# Patient Record
Sex: MALE | Race: BLACK OR AFRICAN AMERICAN | NOT HISPANIC OR LATINO | RURAL
[De-identification: names, ages, dates, MRNs, and addresses within clinical notes are randomized per-mention and may not be internally consistent; named-entity substitution may affect disease eponyms.]

---

## 2021-12-10 ENCOUNTER — HOSPITAL ENCOUNTER (EMERGENCY)
Facility: HOSPITAL | Age: 26
Discharge: LEFT BEFORE DISCHARGE | End: 2021-12-10
Attending: FAMILY MEDICINE
Payer: OTHER GOVERNMENT

## 2021-12-10 VITALS
WEIGHT: 210 LBS | HEART RATE: 104 BPM | DIASTOLIC BLOOD PRESSURE: 89 MMHG | SYSTOLIC BLOOD PRESSURE: 122 MMHG | RESPIRATION RATE: 19 BRPM | OXYGEN SATURATION: 97 % | HEIGHT: 74 IN | BODY MASS INDEX: 26.95 KG/M2

## 2021-12-10 DIAGNOSIS — F20.0 PARANOID SCHIZOPHRENIA: Primary | ICD-10-CM

## 2021-12-10 LAB
ALBUMIN SERPL BCP-MCNC: 3.9 G/DL (ref 3.5–5)
ALBUMIN/GLOB SERPL: 1.1 {RATIO}
ALP SERPL-CCNC: 96 U/L (ref 45–115)
ALT SERPL W P-5'-P-CCNC: 25 U/L (ref 16–61)
AMPHET UR QL SCN: POSITIVE
ANION GAP SERPL CALCULATED.3IONS-SCNC: 13 MMOL/L (ref 7–16)
APAP SERPL-MCNC: <2 ΜG/ML (ref 10–30)
AST SERPL W P-5'-P-CCNC: 19 U/L (ref 15–37)
BARBITURATES UR QL SCN: NEGATIVE
BASOPHILS # BLD AUTO: 0.08 K/UL (ref 0–0.2)
BASOPHILS NFR BLD AUTO: 0.9 % (ref 0–1)
BENZODIAZ METAB UR QL SCN: NEGATIVE
BILIRUB SERPL-MCNC: 0.4 MG/DL (ref 0–1.2)
BILIRUB UR QL STRIP: NEGATIVE
BUN SERPL-MCNC: 7 MG/DL (ref 7–18)
BUN/CREAT SERPL: 5 (ref 6–20)
CALCIUM SERPL-MCNC: 9 MG/DL (ref 8.5–10.1)
CANNABINOIDS UR QL SCN: POSITIVE
CHLORIDE SERPL-SCNC: 105 MMOL/L (ref 98–107)
CLARITY UR: CLEAR
CO2 SERPL-SCNC: 29 MMOL/L (ref 21–32)
COCAINE UR QL SCN: NEGATIVE
COLOR UR: YELLOW
CREAT SERPL-MCNC: 1.31 MG/DL (ref 0.7–1.3)
DIFFERENTIAL METHOD BLD: ABNORMAL
EOSINOPHIL # BLD AUTO: 0.46 K/UL (ref 0–0.5)
EOSINOPHIL NFR BLD AUTO: 5.3 % (ref 1–4)
ERYTHROCYTE [DISTWIDTH] IN BLOOD BY AUTOMATED COUNT: 12.8 % (ref 11.5–14.5)
ETHANOL, BLOOD (CATEGORY): NOT DETECTED
GLOBULIN SER-MCNC: 3.7 G/DL (ref 2–4)
GLUCOSE SERPL-MCNC: 90 MG/DL (ref 74–106)
GLUCOSE UR STRIP-MCNC: NEGATIVE MG/DL
HCT VFR BLD AUTO: 44.7 % (ref 40–54)
HGB BLD-MCNC: 15.7 G/DL (ref 13.5–18)
IMM GRANULOCYTES # BLD AUTO: 0.02 K/UL (ref 0–0.04)
IMM GRANULOCYTES NFR BLD: 0.2 % (ref 0–0.4)
KETONES UR STRIP-SCNC: ABNORMAL MG/DL
LEUKOCYTE ESTERASE UR QL STRIP: NEGATIVE
LYMPHOCYTES # BLD AUTO: 3.03 K/UL (ref 1–4.8)
LYMPHOCYTES NFR BLD AUTO: 35 % (ref 27–41)
MCH RBC QN AUTO: 30.5 PG (ref 27–31)
MCHC RBC AUTO-ENTMCNC: 35.1 G/DL (ref 32–36)
MCV RBC AUTO: 87 FL (ref 80–96)
MONOCYTES # BLD AUTO: 0.96 K/UL (ref 0–0.8)
MONOCYTES NFR BLD AUTO: 11.1 % (ref 2–6)
MPC BLD CALC-MCNC: 9.8 FL (ref 9.4–12.4)
NEUTROPHILS # BLD AUTO: 4.1 K/UL (ref 1.8–7.7)
NEUTROPHILS NFR BLD AUTO: 47.5 % (ref 53–65)
NITRITE UR QL STRIP: NEGATIVE
NRBC # BLD AUTO: 0 X10E3/UL
NRBC, AUTO (.00): 0 %
OPIATES UR QL SCN: NEGATIVE
PCP UR QL SCN: NEGATIVE
PH UR STRIP: 6 PH UNITS
PLATELET # BLD AUTO: 330 K/UL (ref 150–400)
POTASSIUM SERPL-SCNC: 3.9 MMOL/L (ref 3.5–5.1)
PROT SERPL-MCNC: 7.6 G/DL (ref 6.4–8.2)
PROT UR QL STRIP: NEGATIVE
RBC # BLD AUTO: 5.14 M/UL (ref 4.6–6.2)
RBC # UR STRIP: NEGATIVE /UL
SALICYLATES SERPL-MCNC: 4.3 MG/DL (ref 3–30)
SARS-COV-2 RDRP RESP QL NAA+PROBE: NEGATIVE
SODIUM SERPL-SCNC: 143 MMOL/L (ref 136–145)
SP GR UR STRIP: >=1.03
UROBILINOGEN UR STRIP-ACNC: 0.2 MG/DL
WBC # BLD AUTO: 8.65 K/UL (ref 4.5–11)

## 2021-12-10 PROCEDURE — 81003 URINALYSIS AUTO W/O SCOPE: CPT | Mod: 59 | Performed by: FAMILY MEDICINE

## 2021-12-10 PROCEDURE — 87635 SARS-COV-2 COVID-19 AMP PRB: CPT | Performed by: FAMILY MEDICINE

## 2021-12-10 PROCEDURE — 99283 EMERGENCY DEPT VISIT LOW MDM: CPT | Mod: ,,, | Performed by: FAMILY MEDICINE

## 2021-12-10 PROCEDURE — 63600175 PHARM REV CODE 636 W HCPCS: Performed by: FAMILY MEDICINE

## 2021-12-10 PROCEDURE — 96372 THER/PROPH/DIAG INJ SC/IM: CPT

## 2021-12-10 PROCEDURE — 36415 COLL VENOUS BLD VENIPUNCTURE: CPT | Performed by: FAMILY MEDICINE

## 2021-12-10 PROCEDURE — 85025 COMPLETE CBC W/AUTO DIFF WBC: CPT | Performed by: FAMILY MEDICINE

## 2021-12-10 PROCEDURE — 99283 PR EMERGENCY DEPT VISIT,LEVEL III: ICD-10-PCS | Mod: ,,, | Performed by: FAMILY MEDICINE

## 2021-12-10 PROCEDURE — 80053 COMPREHEN METABOLIC PANEL: CPT | Performed by: FAMILY MEDICINE

## 2021-12-10 PROCEDURE — 80143 DRUG ASSAY ACETAMINOPHEN: CPT | Performed by: FAMILY MEDICINE

## 2021-12-10 PROCEDURE — 80307 DRUG TEST PRSMV CHEM ANLYZR: CPT | Performed by: FAMILY MEDICINE

## 2021-12-10 PROCEDURE — 99284 EMERGENCY DEPT VISIT MOD MDM: CPT

## 2021-12-10 PROCEDURE — 82077 ASSAY SPEC XCP UR&BREATH IA: CPT | Performed by: FAMILY MEDICINE

## 2021-12-10 RX ORDER — KETOROLAC TROMETHAMINE 30 MG/ML
60 INJECTION, SOLUTION INTRAMUSCULAR; INTRAVENOUS
Status: COMPLETED | OUTPATIENT
Start: 2021-12-10 | End: 2021-12-10

## 2021-12-10 RX ORDER — HALOPERIDOL 5 MG/ML
5 INJECTION INTRAMUSCULAR
Status: COMPLETED | OUTPATIENT
Start: 2021-12-10 | End: 2021-12-10

## 2021-12-10 RX ADMIN — KETOROLAC TROMETHAMINE 60 MG: 30 INJECTION, SOLUTION INTRAMUSCULAR; INTRAVENOUS at 12:12

## 2021-12-10 RX ADMIN — HALOPERIDOL LACTATE 5 MG: 5 INJECTION, SOLUTION INTRAMUSCULAR at 12:12

## 2022-04-30 ENCOUNTER — HOSPITAL ENCOUNTER (INPATIENT)
Facility: HOSPITAL | Age: 27
LOS: 6 days | Discharge: HOME | DRG: 897 | End: 2022-05-06
Attending: EMERGENCY MEDICINE | Admitting: INTERNAL MEDICINE

## 2022-04-30 DIAGNOSIS — R00.0 TACHYCARDIA: ICD-10-CM

## 2022-04-30 DIAGNOSIS — N17.9 AKI (ACUTE KIDNEY INJURY): ICD-10-CM

## 2022-04-30 DIAGNOSIS — F29 PSYCHOSIS, UNSPECIFIED PSYCHOSIS TYPE: ICD-10-CM

## 2022-04-30 DIAGNOSIS — R45.851 SUICIDAL IDEATION: ICD-10-CM

## 2022-04-30 DIAGNOSIS — R00.0 SINUS TACHYCARDIA: ICD-10-CM

## 2022-04-30 DIAGNOSIS — T14.90XA INJURY: ICD-10-CM

## 2022-04-30 DIAGNOSIS — F15.921 AMPHETAMINE DELIRIUM: ICD-10-CM

## 2022-04-30 DIAGNOSIS — R50.9 FEVER: ICD-10-CM

## 2022-04-30 DIAGNOSIS — F19.10 POLYSUBSTANCE ABUSE: ICD-10-CM

## 2022-04-30 DIAGNOSIS — F15.929 METHAMPHETAMINE INTOXICATION: Primary | ICD-10-CM

## 2022-04-30 DIAGNOSIS — M62.82 NON-TRAUMATIC RHABDOMYOLYSIS: ICD-10-CM

## 2022-04-30 DIAGNOSIS — J18.9 PNEUMONIA OF LOWER LOBE DUE TO INFECTIOUS ORGANISM, UNSPECIFIED LATERALITY: ICD-10-CM

## 2022-04-30 LAB
ALBUMIN SERPL BCP-MCNC: 3.7 G/DL (ref 3.5–5)
ALBUMIN/GLOB SERPL: 1.1 {RATIO}
ALP SERPL-CCNC: 95 U/L (ref 45–115)
ALT SERPL W P-5'-P-CCNC: 27 U/L (ref 16–61)
AMPHET UR QL SCN: POSITIVE
ANION GAP SERPL CALCULATED.3IONS-SCNC: 29 MMOL/L (ref 7–16)
APAP SERPL-MCNC: <2 ΜG/ML (ref 10–30)
AST SERPL W P-5'-P-CCNC: 23 U/L (ref 15–37)
BACTERIA #/AREA URNS HPF: ABNORMAL /HPF
BARBITURATES UR QL SCN: NEGATIVE
BASOPHILS # BLD AUTO: 0.08 K/UL (ref 0–0.2)
BASOPHILS NFR BLD AUTO: 0.8 % (ref 0–1)
BENZODIAZ METAB UR QL SCN: POSITIVE
BILIRUB SERPL-MCNC: 0.7 MG/DL (ref 0–1.2)
BILIRUB UR QL STRIP: NEGATIVE
BUN SERPL-MCNC: 7 MG/DL (ref 7–18)
BUN/CREAT SERPL: 3 (ref 6–20)
CALCIUM SERPL-MCNC: 9.8 MG/DL (ref 8.5–10.1)
CANNABINOIDS UR QL SCN: POSITIVE
CHLORIDE SERPL-SCNC: 102 MMOL/L (ref 98–107)
CK SERPL-CCNC: 757 U/L (ref 39–308)
CLARITY UR: ABNORMAL
CO2 SERPL-SCNC: 14 MMOL/L (ref 21–32)
COCAINE UR QL SCN: NEGATIVE
COLOR UR: YELLOW
CREAT SERPL-MCNC: 2.08 MG/DL (ref 0.7–1.3)
DIFFERENTIAL METHOD BLD: ABNORMAL
EOSINOPHIL # BLD AUTO: 0.29 K/UL (ref 0–0.5)
EOSINOPHIL NFR BLD AUTO: 2.7 % (ref 1–4)
ERYTHROCYTE [DISTWIDTH] IN BLOOD BY AUTOMATED COUNT: 12.6 % (ref 11.5–14.5)
ETHANOL, BLOOD (CATEGORY): NOT DETECTED
FLUAV AG UPPER RESP QL IA.RAPID: NEGATIVE
FLUBV AG UPPER RESP QL IA.RAPID: NEGATIVE
GLOBULIN SER-MCNC: 3.5 G/DL (ref 2–4)
GLUCOSE SERPL-MCNC: 133 MG/DL (ref 74–106)
GLUCOSE UR STRIP-MCNC: NEGATIVE MG/DL
HCT VFR BLD AUTO: 45.8 % (ref 40–54)
HGB BLD-MCNC: 16 G/DL (ref 13.5–18)
IMM GRANULOCYTES # BLD AUTO: 0.3 K/UL (ref 0–0.04)
IMM GRANULOCYTES NFR BLD: 2.8 % (ref 0–0.4)
KETONES UR STRIP-SCNC: ABNORMAL MG/DL
LEUKOCYTE ESTERASE UR QL STRIP: NEGATIVE
LYMPHOCYTES # BLD AUTO: 1.59 K/UL (ref 1–4.8)
LYMPHOCYTES NFR BLD AUTO: 14.9 % (ref 27–41)
MAGNESIUM SERPL-MCNC: 3.2 MG/DL (ref 1.7–2.3)
MCH RBC QN AUTO: 30.2 PG (ref 27–31)
MCHC RBC AUTO-ENTMCNC: 34.9 G/DL (ref 32–36)
MCV RBC AUTO: 86.6 FL (ref 80–96)
MONOCYTES # BLD AUTO: 0.81 K/UL (ref 0–0.8)
MONOCYTES NFR BLD AUTO: 7.6 % (ref 2–6)
MPC BLD CALC-MCNC: 9.8 FL (ref 9.4–12.4)
MUCOUS THREADS #/AREA URNS HPF: ABNORMAL /HPF
NEUTROPHILS # BLD AUTO: 7.57 K/UL (ref 1.8–7.7)
NEUTROPHILS NFR BLD AUTO: 71.2 % (ref 53–65)
NITRITE UR QL STRIP: NEGATIVE
NRBC # BLD AUTO: 0 X10E3/UL
NRBC, AUTO (.00): 0 %
OPIATES UR QL SCN: NEGATIVE
PCP UR QL SCN: NEGATIVE
PH UR STRIP: 7.5 PH UNITS
PLATELET # BLD AUTO: 314 K/UL (ref 150–400)
POTASSIUM SERPL-SCNC: 4.1 MMOL/L (ref 3.5–5.1)
PROT SERPL-MCNC: 7.2 G/DL (ref 6.4–8.2)
PROT UR QL STRIP: 100
RBC # BLD AUTO: 5.29 M/UL (ref 4.6–6.2)
RBC # UR STRIP: ABNORMAL /UL
RBC #/AREA URNS HPF: ABNORMAL /HPF
SALICYLATES SERPL-MCNC: 3.7 MG/DL (ref 3–30)
SARS-COV+SARS-COV-2 AG RESP QL IA.RAPID: NEGATIVE
SODIUM SERPL-SCNC: 141 MMOL/L (ref 136–145)
SP GR UR STRIP: 1.02
SQUAMOUS #/AREA URNS LPF: ABNORMAL /LPF
UROBILINOGEN UR STRIP-ACNC: 0.2 MG/DL
WBC # BLD AUTO: 10.64 K/UL (ref 4.5–11)
WBC #/AREA URNS HPF: ABNORMAL /HPF

## 2022-04-30 PROCEDURE — 99222 1ST HOSP IP/OBS MODERATE 55: CPT | Mod: ,,, | Performed by: INTERNAL MEDICINE

## 2022-04-30 PROCEDURE — 80307 DRUG TEST PRSMV CHEM ANLYZR: CPT | Performed by: EMERGENCY MEDICINE

## 2022-04-30 PROCEDURE — 93010 ELECTROCARDIOGRAM REPORT: CPT | Mod: 76,,, | Performed by: INTERNAL MEDICINE

## 2022-04-30 PROCEDURE — 93010 ELECTROCARDIOGRAM REPORT: CPT | Mod: ,,, | Performed by: INTERNAL MEDICINE

## 2022-04-30 PROCEDURE — 80053 COMPREHEN METABOLIC PANEL: CPT | Performed by: EMERGENCY MEDICINE

## 2022-04-30 PROCEDURE — 82077 ASSAY SPEC XCP UR&BREATH IA: CPT | Performed by: EMERGENCY MEDICINE

## 2022-04-30 PROCEDURE — 99284 PR EMERGENCY DEPT VISIT,LEVEL IV: ICD-10-PCS | Mod: ,,, | Performed by: EMERGENCY MEDICINE

## 2022-04-30 PROCEDURE — 96375 TX/PRO/DX INJ NEW DRUG ADDON: CPT

## 2022-04-30 PROCEDURE — 63600175 PHARM REV CODE 636 W HCPCS

## 2022-04-30 PROCEDURE — 99284 EMERGENCY DEPT VISIT MOD MDM: CPT | Mod: ,,, | Performed by: EMERGENCY MEDICINE

## 2022-04-30 PROCEDURE — 20000000 HC ICU ROOM

## 2022-04-30 PROCEDURE — 25000003 PHARM REV CODE 250: Performed by: INTERNAL MEDICINE

## 2022-04-30 PROCEDURE — 84439 ASSAY OF FREE THYROXINE: CPT | Performed by: EMERGENCY MEDICINE

## 2022-04-30 PROCEDURE — 80143 DRUG ASSAY ACETAMINOPHEN: CPT | Performed by: EMERGENCY MEDICINE

## 2022-04-30 PROCEDURE — 84443 ASSAY THYROID STIM HORMONE: CPT | Performed by: EMERGENCY MEDICINE

## 2022-04-30 PROCEDURE — 96367 TX/PROPH/DG ADDL SEQ IV INF: CPT

## 2022-04-30 PROCEDURE — 99285 EMERGENCY DEPT VISIT HI MDM: CPT | Mod: 25

## 2022-04-30 PROCEDURE — 85025 COMPLETE CBC W/AUTO DIFF WBC: CPT | Performed by: EMERGENCY MEDICINE

## 2022-04-30 PROCEDURE — 96365 THER/PROPH/DIAG IV INF INIT: CPT

## 2022-04-30 PROCEDURE — 93010 EKG 12-LEAD: ICD-10-PCS | Mod: ,,, | Performed by: INTERNAL MEDICINE

## 2022-04-30 PROCEDURE — 87040 BLOOD CULTURE FOR BACTERIA: CPT | Performed by: EMERGENCY MEDICINE

## 2022-04-30 PROCEDURE — 93005 ELECTROCARDIOGRAM TRACING: CPT

## 2022-04-30 PROCEDURE — 83735 ASSAY OF MAGNESIUM: CPT | Performed by: EMERGENCY MEDICINE

## 2022-04-30 PROCEDURE — 87428 SARSCOV & INF VIR A&B AG IA: CPT | Performed by: EMERGENCY MEDICINE

## 2022-04-30 PROCEDURE — 63600175 PHARM REV CODE 636 W HCPCS: Performed by: EMERGENCY MEDICINE

## 2022-04-30 PROCEDURE — 25000003 PHARM REV CODE 250: Performed by: EMERGENCY MEDICINE

## 2022-04-30 PROCEDURE — 82550 ASSAY OF CK (CPK): CPT | Performed by: INTERNAL MEDICINE

## 2022-04-30 PROCEDURE — 81001 URINALYSIS AUTO W/SCOPE: CPT | Performed by: EMERGENCY MEDICINE

## 2022-04-30 PROCEDURE — 63600175 PHARM REV CODE 636 W HCPCS: Performed by: INTERNAL MEDICINE

## 2022-04-30 PROCEDURE — 99222 PR INITIAL HOSPITAL CARE,LEVL II: ICD-10-PCS | Mod: ,,, | Performed by: INTERNAL MEDICINE

## 2022-04-30 PROCEDURE — 36415 COLL VENOUS BLD VENIPUNCTURE: CPT | Performed by: EMERGENCY MEDICINE

## 2022-04-30 RX ORDER — HALOPERIDOL 5 MG/ML
5 INJECTION INTRAMUSCULAR
Status: COMPLETED | OUTPATIENT
Start: 2022-04-30 | End: 2022-04-30

## 2022-04-30 RX ORDER — ACETAMINOPHEN 500 MG
1000 TABLET ORAL
Status: COMPLETED | OUTPATIENT
Start: 2022-04-30 | End: 2022-04-30

## 2022-04-30 RX ORDER — DIPHENHYDRAMINE HYDROCHLORIDE 50 MG/ML
INJECTION INTRAMUSCULAR; INTRAVENOUS
Status: COMPLETED
Start: 2022-04-30 | End: 2022-04-30

## 2022-04-30 RX ORDER — SODIUM CHLORIDE 0.9 % (FLUSH) 0.9 %
10 SYRINGE (ML) INJECTION
Status: DISCONTINUED | OUTPATIENT
Start: 2022-04-30 | End: 2022-05-06 | Stop reason: HOSPADM

## 2022-04-30 RX ORDER — HALOPERIDOL 5 MG/ML
INJECTION INTRAMUSCULAR
Status: COMPLETED
Start: 2022-04-30 | End: 2022-04-30

## 2022-04-30 RX ORDER — LORAZEPAM 2 MG/ML
INJECTION INTRAMUSCULAR
Status: COMPLETED
Start: 2022-04-30 | End: 2022-04-30

## 2022-04-30 RX ORDER — LORAZEPAM 2 MG/ML
2 INJECTION INTRAMUSCULAR 4 TIMES DAILY PRN
Status: DISCONTINUED | OUTPATIENT
Start: 2022-04-30 | End: 2022-05-06 | Stop reason: HOSPADM

## 2022-04-30 RX ORDER — SODIUM CHLORIDE 450 MG/100ML
INJECTION, SOLUTION INTRAVENOUS CONTINUOUS
Status: DISCONTINUED | OUTPATIENT
Start: 2022-04-30 | End: 2022-05-02

## 2022-04-30 RX ORDER — DIPHENHYDRAMINE HYDROCHLORIDE 50 MG/ML
25 INJECTION INTRAMUSCULAR; INTRAVENOUS
Status: COMPLETED | OUTPATIENT
Start: 2022-04-30 | End: 2022-04-30

## 2022-04-30 RX ORDER — LORAZEPAM 2 MG/ML
1 INJECTION INTRAMUSCULAR
Status: COMPLETED | OUTPATIENT
Start: 2022-04-30 | End: 2022-04-30

## 2022-04-30 RX ADMIN — ACETAMINOPHEN 1000 MG: 500 TABLET ORAL at 03:04

## 2022-04-30 RX ADMIN — SODIUM CHLORIDE: 4.5 INJECTION, SOLUTION INTRAVENOUS at 06:04

## 2022-04-30 RX ADMIN — HALOPERIDOL LACTATE 5 MG: 5 INJECTION, SOLUTION INTRAMUSCULAR at 02:04

## 2022-04-30 RX ADMIN — LORAZEPAM 1 MG: 2 INJECTION INTRAMUSCULAR; INTRAVENOUS at 02:04

## 2022-04-30 RX ADMIN — DIPHENHYDRAMINE HYDROCHLORIDE 25 MG: 50 INJECTION, SOLUTION INTRAMUSCULAR; INTRAVENOUS at 02:04

## 2022-04-30 RX ADMIN — SODIUM CHLORIDE 1000 ML: 9 INJECTION, SOLUTION INTRAVENOUS at 05:04

## 2022-04-30 RX ADMIN — DIPHENHYDRAMINE HYDROCHLORIDE 25 MG: 50 INJECTION INTRAMUSCULAR; INTRAVENOUS at 02:04

## 2022-04-30 RX ADMIN — CEFTRIAXONE SODIUM 1 G: 1 INJECTION, POWDER, FOR SOLUTION INTRAMUSCULAR; INTRAVENOUS at 04:04

## 2022-04-30 RX ADMIN — HALOPERIDOL 5 MG: 5 INJECTION INTRAMUSCULAR at 02:04

## 2022-04-30 RX ADMIN — LORAZEPAM 1 MG: 2 INJECTION INTRAMUSCULAR at 02:04

## 2022-04-30 RX ADMIN — AZITHROMYCIN MONOHYDRATE 500 MG: 500 INJECTION, POWDER, LYOPHILIZED, FOR SOLUTION INTRAVENOUS at 05:04

## 2022-04-30 NOTE — ED PROVIDER NOTES
Encounter Date: 4/30/2022    SCRIBE #1 NOTE: I, Johana Ilir, am scribing for, and in the presence of,  Alcon Navarrete MD. I have scribed the entire note.       History     Chief Complaint   Patient presents with    Mental Health Problem     Patient is a 26 year old male who presents to the emergency department via EMS due to an attempted suicide and psychiatric disorder. EMS report that they were called to the scene by patient's family due to suspected drug use and suicide attempt. Patient has a history of psychiatric issues and drug abuse as reported by family. Family report that the patient has been followed by [facility] in the past.  He was found attempting to jump from 2nd story [facility]. EMS personnel report that the patient was combative en route. While en route it was discovered that the patient was tachycardic, he was given adenosine which slightly decreased the rate. Family deny any other medical history.     The history is provided by the EMS personnel. No  was used.     Review of patient's allergies indicates:  No Known Allergies  No past medical history on file.  No past surgical history on file.  No family history on file.  Social History     Tobacco Use    Smoking status: Current Every Day Smoker   Substance Use Topics    Alcohol use: Yes    Drug use: Not Currently     Review of Systems   Unable to perform ROS: Psychiatric disorder       Physical Exam     Initial Vitals   BP Pulse Resp Temp SpO2   04/30/22 1434 04/30/22 1434 04/30/22 1434 04/30/22 1448 04/30/22 1434   (!) 149/125 (!) 190 (!) 24 (!) 101.5 °F (38.6 °C) 97 %      MAP       --                Physical Exam    Nursing note and vitals reviewed.  Constitutional: He appears well-developed and well-nourished.   HENT:   Head: Normocephalic and atraumatic.   Eyes: EOM are normal. Pupils are equal, round, and reactive to light.   Neck: Neck supple. No thyromegaly present.   Normal range of motion.  Cardiovascular: Regular  rhythm, normal heart sounds and intact distal pulses. Tachycardia present.    No murmur heard.  Pulmonary/Chest: Breath sounds normal. No respiratory distress. He has no wheezes.   Abdominal: Abdomen is soft. Bowel sounds are normal. He exhibits no distension. There is no abdominal tenderness.   Musculoskeletal:         General: No tenderness or edema. Normal range of motion.      Cervical back: Normal range of motion and neck supple.     Lymphadenopathy:     He has no cervical adenopathy.   Neurological: He is alert and oriented to person, place, and time. He has normal strength. No cranial nerve deficit or sensory deficit.   Skin: Skin is warm and dry. Capillary refill takes less than 2 seconds. No rash noted.   Psychiatric: His mood appears anxious. Thought content is paranoid.         ED Course   Procedures  Labs Reviewed   COMPREHENSIVE METABOLIC PANEL - Abnormal; Notable for the following components:       Result Value    CO2 14 (*)     Anion Gap 29 (*)     Glucose 133 (*)     Creatinine 2.08 (*)     BUN/Creatinine Ratio 3 (*)     eGFR 41 (*)     All other components within normal limits   URINALYSIS, REFLEX TO URINE CULTURE - Abnormal; Notable for the following components:    Clarity, UA Cloudy (*)     Protein,   (*)     Blood, UA Moderate (*)     All other components within normal limits   MAGNESIUM - Abnormal; Notable for the following components:    Magnesium 3.2 (*)     All other components within normal limits   DRUG SCREEN, URINE (BEAKER) - Abnormal; Notable for the following components:    Benzodiazepine, Urine Positive (*)     Amphetamine Positive (*)     Cannabinoid, Urine Positive (*)     All other components within normal limits    Narrative:     The results of screening tests should be considered presumptive. Confirmatory testing is available upon request.    Cutoff Points:  PCP:         25ng/mL  AMPH:        500ng/mL  JESSENIA:        200ng/mL  JULIO:        200ng/mL  THC:         50ng/mL  KORIN:          300ng/mL  OPI:         2000ng/mL   ACETAMINOPHEN LEVEL - Abnormal; Notable for the following components:    Acetaminophen <2 (*)     All other components within normal limits   CBC WITH DIFFERENTIAL - Abnormal; Notable for the following components:    Neutrophils % 71.2 (*)     Lymphocytes % 14.9 (*)     Monocytes % 7.6 (*)     Immature Granulocytes % 2.8 (*)     Monocytes, Absolute 0.81 (*)     Immature Granulocytes, Absolute 0.30 (*)     All other components within normal limits   URINALYSIS, MICROSCOPIC - Abnormal; Notable for the following components:    RBC, UA 5-10 (*)     Bacteria, UA Few (*)     Squamous Epithelial Cells, UA Few (*)     Mucus, UA Few (*)     All other components within normal limits   CK - Abnormal; Notable for the following components:     (*)     All other components within normal limits   SARS-COV2 (COVID) W/ FLU ANTIGEN - Normal    Narrative:     Negative SARS-CoV results should not be used as the sole basis for treatment or patient management decisions; negative results should be considered in the context of a patient's recent exposures, history and the presene of clinical signs and symptoms consistent with COVID-19.  Negative results should be treated as presumptive and confirmed by molecular assay, if necessary for patient management.   SALICYLATE LEVEL - Normal   CULTURE, BLOOD   CULTURE, BLOOD   CBC W/ AUTO DIFFERENTIAL    Narrative:     The following orders were created for panel order CBC auto differential.  Procedure                               Abnormality         Status                     ---------                               -----------         ------                     CBC with Differential[[id number]]        Abnormal            Final result                 Please view results for these tests on the individual orders.   ALCOHOL,MEDICAL (ETHANOL)   TSH   T4, FREE   EXTRA TUBES    Narrative:     The following orders were created for panel order EXTRA  TUBES.  Procedure                               Abnormality         Status                     ---------                               -----------         ------                     Light Blue Top Hold[[id number]]                              In process                 Red Top Hold[[id number]]                                     In process                 Light Green Top Hold[[id number]]                             In process                   Please view results for these tests on the individual orders.   LIGHT BLUE TOP HOLD   RED TOP HOLD   LIGHT GREEN TOP HOLD        ECG Results          EKG 12-lead (In process)  Result time 04/30/22 17:33:58    In process by Interface, Lab In [facility] (04/30/22 17:33:58)                 Narrative:    Test Reason : R00.0,    Vent. Rate : 126 BPM     Atrial Rate : 000 BPM     P-R Int : 130 ms          QRS Dur : 074 ms      QT Int : 308 ms       P-R-T Axes : 061 017 003 degrees     QTc Int : 424 ms    Sinus tachycardia  Inferior T wave abnormality  is nonspecific  Borderline ECG      Referred By: [email or web address]   SELF           Confirmed By:                             Imaging Results          X-Ray Forearm Right (Final result)  Result time 04/30/22 16:04:17    Final result by Brenden Baker DO (04/30/22 16:04:17)                 Impression:      As above.      Electronically signed by: Brenden Baker  Date:    04/30/2022  Time:    16:04             Narrative:    EXAMINATION:  XR FOREARM RIGHT    CLINICAL HISTORY:  Injury, unspecified, initial encounter    TECHNIQUE:  XR FOREARM RIGHT    COMPARISON:  Comparisons were reviewed, if available.    FINDINGS:  No acute fracture or dislocation.    No joint abnormality.    No radiopaque foreign bodies.                               X-Ray Chest 1 View (Final result)  Result time 04/30/22 15:17:04    Final result by Brenden Baker DO (04/30/22 15:17:04)                 Impression:      As above.      Electronically signed by: Brenden  Samuel  Date:    04/30/2022  Time:    15:17             Narrative:    EXAMINATION:  XR CHEST 1 VIEW    CLINICAL HISTORY:  Fever, unspecified    TECHNIQUE:  XR CHEST 1 VIEW    COMPARISON:  Comparisons were reviewed, if available.    FINDINGS:  Patchy airspace opacities right lower lobe.  Atelectasis versus pneumonia.                                 Medications   sodium chloride 0.9% flush 10 mL (has no administration in time range)   0.45% NaCl infusion ( Intravenous New Bag 5/1/22 0313)   LORazepam injection 2 mg (has no administration in time range)   diphenhydrAMINE injection 25 mg (25 mg Intravenous Given 4/30/22 1451)   haloperidol lactate injection 5 mg (5 mg Intravenous Given 4/30/22 1451)   LORazepam injection 1 mg (1 mg Intravenous Given 4/30/22 1450)   acetaminophen tablet 1,000 mg (1,000 mg Oral Given 4/30/22 1512)   cefTRIAXone (ROCEPHIN) 1 g in dextrose 5 % in water (D5W) 5 % 50 mL IVPB (MB+) (0 g Intravenous Stopped 4/30/22 1700)   azithromycin (ZITHROMAX) 500 mg in dextrose 5 % 250 mL IVPB (0 mg Intravenous Stopped 4/30/22 1807)   sodium chloride 0.9% bolus 1,000 mL (0 mLs Intravenous Stopped 4/30/22 1833)                Attending Attestation:           Physician Attestation for Scribe:  Physician Attestation Statement for Scribe #1: I, Alcon Navarrete MD, reviewed documentation, as scribed by Johana Hazel in my presence, and it is both accurate and complete.             ED Course as of 05/01/22 0552   Sat Apr 30, 2022   1449 Restraints were applied.  Patient tried to be redirected unsuccessfully.  No other options available to safely restrain the patient.  The drains were avoid see a [PK]   1450 Continuing the 1 L of normal saline started by EMS [PK]   1643 Patient is out of restraints. [PK]   1644 Repeat EKG done at 4:45 p.m. shows sinus tachycardia rate 126. No ST elevation.  T-waves unremarkable.   [PK]      ED Course User Index  [PK] Alcon Navarrete MD             Clinical Impression:    Final diagnoses:  [R00.0] Tachycardia  [R50.9] Fever  [T14.90XA] Injury  [F15.929] Methamphetamine intoxication (Primary)  [J18.9] Pneumonia of lower lobe due to infectious organism, unspecified laterality  [R45.851] Suicidal ideation          ED Disposition Condition    Admit               Alcon Navarrete MD  05/01/22 0588

## 2022-04-30 NOTE — ASSESSMENT & PLAN NOTE
Patient had a positive amphetamine tests is running fevers tachycardic had very bizarre behavior he denies using any.  Our plan is to manage his temperature giving fluids and see how he does

## 2022-04-30 NOTE — H&P
[facility] - Emergency Department  Pulmonology  H&P    Patient Name: Jorge Riggins  MRN: [id number]  Admission Date: 4/30/2022  Code Status: Full Code  Primary Care Provider: Primary Doctor No   Principal Problem: Amphetamine delirium    Subjective:     HPI:  Patient admits to using marijuana and ecstasy over the last couple of days says he had done 2 days his drug screen is present present for benzodiazepines and amphetamines and marijuana.  Today he was running around the apartment complex naked his mother was called when she got there he had some pants on they tried to severe doing min had to use a [place] on his arm to get him to remove from the premises.  He is search since then been sedated with Ativan is much more awake alert denies any homicidal or suicidal ideations      No past medical history on file.    No past surgical history on file.    Review of patient's allergies indicates:  Not on File    Family History    None       Tobacco Use    Smoking status: Current Every Day Smoker    Smokeless tobacco: Not on file   Substance and Sexual Activity    Alcohol use: Yes    Drug use: Not Currently    Sexual activity: Not on file         Review of Systems   Constitutional:  Negative for activity change and appetite change.   HENT:  Negative for congestion and dental problem.    Eyes:  Negative for discharge and itching.   Respiratory:  Negative for apnea and chest tightness.    Cardiovascular:  Negative for chest pain and leg swelling.   Gastrointestinal:  Negative for abdominal distention.   Endocrine: Negative for cold intolerance and heat intolerance.   Genitourinary:  Negative for difficulty urinating and dysuria.   Musculoskeletal:  Negative for arthralgias and back pain.   Skin:  Negative for color change.   Allergic/Immunologic: Negative for environmental allergies and food allergies.   Neurological:  Negative for dizziness and facial asymmetry.   Hematological:  Negative for adenopathy.  Does not bruise/bleed easily.   Psychiatric/Behavioral:  Negative for agitation and behavioral problems.    Objective:     Vital Signs (Most Recent):  Temp: (!) 101.5 °F (38.6 °C) (04/30/22 1512)  Pulse: (!) 190 (04/30/22 1434)  Resp: (!) 24 (04/30/22 1434)  BP: (!) 149/125 (04/30/22 1434)  SpO2: 97 % (04/30/22 1434)   Vital Signs (24h Range):  Temp:  [101.5 °F (38.6 °C)] 101.5 °F (38.6 °C)  Pulse:  [190] 190  Resp:  [24] 24  SpO2:  [97 %] 97 %  BP: (149)/(125) 149/125     Weight: 95.3 kg (210 lb)  Body mass index is 26.96 kg/m².    No intake or output data in the 24 hours ending 04/30/22 1705    Physical Exam  Vitals reviewed.   Constitutional:       Appearance: Normal appearance.      Interventions: He is not intubated.  HENT:      Head: Normocephalic and atraumatic.      Nose: Nose normal.      Mouth/Throat:      Mouth: Mucous membranes are dry.      Pharynx: Oropharynx is clear.   Eyes:      Extraocular Movements: Extraocular movements intact.      Conjunctiva/sclera: Conjunctivae normal.      Pupils: Pupils are equal, round, and reactive to light.   Cardiovascular:      Rate and Rhythm: Normal rate.      Heart sounds: Normal heart sounds. No murmur heard.  Pulmonary:      Effort: Pulmonary effort is normal. He is not intubated.      Breath sounds: Normal breath sounds.   Abdominal:      General: Abdomen is flat. Bowel sounds are normal.      Palpations: Abdomen is soft.   Musculoskeletal:         General: Normal range of motion.      Cervical back: Normal range of motion and neck supple.      Right lower leg: No edema.      Left lower leg: No edema.   Skin:     General: Skin is warm and dry.      Capillary Refill: Capillary refill takes less than 2 seconds.   Neurological:      General: No focal deficit present.      Mental Status: He is alert and oriented to person, place, and time.   Psychiatric:         Mood and Affect: Mood normal.         Behavior: Behavior normal.       Vents:       Lines/Drains/Airways        Peripheral Intravenous Line  Duration                  Peripheral IV - Single Lumen 04/30/22 1440 20 G Anterior;Proximal;Right Forearm <1 day                    Significant Labs:    CBC/Anemia Profile:  Recent Labs   Lab 04/30/22  1503   WBC 10.64   HGB 16.0   HCT 45.8      MCV 86.6   RDW 12.6        Chemistries:  Recent Labs   Lab 04/30/22  1503      K 4.1      CO2 14*   BUN 7   CREATININE 2.08*   CALCIUM 9.8   ALBUMIN 3.7   PROT 7.2   BILITOT 0.7   ALKPHOS 95   ALT 27   AST 23   MG 3.2*       Recent Lab Results         04/30/22  1507   04/30/22  1503        Influenza B, Molecular Negative         Benzodiazepines Positive         Cocaine Negative         BARBITURATES Negative         Albumin/Globulin Ratio   1.1       Acetaminophen (Tylenol), Serum   <2       Albumin   3.7       Alcohol, Serum   Not Detected       Alkaline Phosphatase   95       ALT   27       Amphetamine Positive         Anion Gap   29       Appearance, UA Cloudy         AST   23       Bacteria, UA Few         Baso #   0.08       Basophil %   0.8       Bilirubin (UA) Negative         BILIRUBIN TOTAL   0.7       BUN   7       BUN/CREAT RATIO   3       Calcium   9.8       Cannabinoid Scrn, Ur Positive         Chloride   102       CO2   14       Color, UA Yellow         COVID-19 Ag Negative         Creatinine   2.08       Differential Type   Auto       eGFR if non    41       Eos #   0.29       Eosinophil %   2.7       Globulin, Total   3.5       Glucose   133       Glucose, UA Negative         Hematocrit   45.8       Hemoglobin   16.0       Immature Grans (Abs)   0.30       Immature Granulocytes   2.8       Influenza A Negative         Ketones, UA Trace         Leukocytes, UA Negative         Lymph #   1.59       Lymph %   14.9       Magnesium   3.2       MCH   30.2       MCHC   34.9       MCV   86.6       Mono #   0.81       Mono %   7.6       MPV   9.8       Mucus, UA Few         Neutrophils, Abs   7.57        Neutrophils Relative   71.2       NITRITE UA Negative         nRBC   0.0       NUCLEATED RBC ABSOLUTE   0.00       Occult Blood UA Moderate         Opiate Scrn, Ur Negative         pH, UA 7.5         Phencyclidine Negative         Platelets   314       Potassium   4.1       PROTEIN TOTAL   7.2       Protein,           RBC   5.29       RBC, UA 5-10         RDW   12.6       Salicylate Lvl   3.7       Sodium   141       Specific Gravity, UA 1.020         Squam Epithel, UA Few         UROBILINOGEN UA 0.2         WBC, UA 0-5         WBC   10.64               Significant Imaging:   I have reviewed all pertinent imaging results/findings within the past 24 hours.    Assessment/Plan:     * Amphetamine delirium  Patient had a positive amphetamine tests is running fevers tachycardic had very bizarre behavior he denies using any.  Our plan is to manage his temperature giving fluids and see how he does    Sinus tachycardia  Think this is a combination of the agitation drug use hydrate watch maintain temp    Fever  Think this is reaction to drugs although he has been cultured is on antibiotics because his x-ray looks little abnormal but I do not think it is an actual pneumonia    SARA (acute kidney injury)  I think this is probably due to dehydration when hydrating tonight and see how he does repeat lab in the morning is not better look for secondary causes             Adán Smith MD  Pulmonology  [facility] - Emergency Department

## 2022-04-30 NOTE — ASSESSMENT & PLAN NOTE
Think this is reaction to drugs although he has been cultured is on antibiotics because his x-ray looks little abnormal but I do not think it is an actual pneumonia

## 2022-04-30 NOTE — HPI
Patient admits to using marijuana and ecstasy over the last couple of days says he had done 2 days his drug screen is present present for benzodiazepines and amphetamines and marijuana.  Today he was running around the apartment complex naked his mother was called when she got there he had some pants on they tried to severe doing min had to use a [place] on his arm to get him to remove from the premises.  He is search since then been sedated with Ativan is much more awake alert denies any homicidal or suicidal ideations

## 2022-04-30 NOTE — SUBJECTIVE & OBJECTIVE
No past medical history on file.    No past surgical history on file.    Review of patient's allergies indicates:  Not on File    Family History    None       Tobacco Use    Smoking status: Current Every Day Smoker    Smokeless tobacco: Not on file   Substance and Sexual Activity    Alcohol use: Yes    Drug use: Not Currently    Sexual activity: Not on file         Review of Systems   Constitutional:  Negative for activity change and appetite change.   HENT:  Negative for congestion and dental problem.    Eyes:  Negative for discharge and itching.   Respiratory:  Negative for apnea and chest tightness.    Cardiovascular:  Negative for chest pain and leg swelling.   Gastrointestinal:  Negative for abdominal distention.   Endocrine: Negative for cold intolerance and heat intolerance.   Genitourinary:  Negative for difficulty urinating and dysuria.   Musculoskeletal:  Negative for arthralgias and back pain.   Skin:  Negative for color change.   Allergic/Immunologic: Negative for environmental allergies and food allergies.   Neurological:  Negative for dizziness and facial asymmetry.   Hematological:  Negative for adenopathy. Does not bruise/bleed easily.   Psychiatric/Behavioral:  Negative for agitation and behavioral problems.    Objective:     Vital Signs (Most Recent):  Temp: (!) 101.5 °F (38.6 °C) (04/30/22 1512)  Pulse: (!) 190 (04/30/22 1434)  Resp: (!) 24 (04/30/22 1434)  BP: (!) 149/125 (04/30/22 1434)  SpO2: 97 % (04/30/22 1434)   Vital Signs (24h Range):  Temp:  [101.5 °F (38.6 °C)] 101.5 °F (38.6 °C)  Pulse:  [190] 190  Resp:  [24] 24  SpO2:  [97 %] 97 %  BP: (149)/(125) 149/125     Weight: 95.3 kg (210 lb)  Body mass index is 26.96 kg/m².    No intake or output data in the 24 hours ending 04/30/22 1705    Physical Exam  Vitals reviewed.   Constitutional:       Appearance: Normal appearance.      Interventions: He is not intubated.  HENT:      Head: Normocephalic and atraumatic.      Nose: Nose normal.       Mouth/Throat:      Mouth: Mucous membranes are dry.      Pharynx: Oropharynx is clear.   Eyes:      Extraocular Movements: Extraocular movements intact.      Conjunctiva/sclera: Conjunctivae normal.      Pupils: Pupils are equal, round, and reactive to light.   Cardiovascular:      Rate and Rhythm: Normal rate.      Heart sounds: Normal heart sounds. No murmur heard.  Pulmonary:      Effort: Pulmonary effort is normal. He is not intubated.      Breath sounds: Normal breath sounds.   Abdominal:      General: Abdomen is flat. Bowel sounds are normal.      Palpations: Abdomen is soft.   Musculoskeletal:         General: Normal range of motion.      Cervical back: Normal range of motion and neck supple.      Right lower leg: No edema.      Left lower leg: No edema.   Skin:     General: Skin is warm and dry.      Capillary Refill: Capillary refill takes less than 2 seconds.   Neurological:      General: No focal deficit present.      Mental Status: He is alert and oriented to person, place, and time.   Psychiatric:         Mood and Affect: Mood normal.         Behavior: Behavior normal.       Vents:       Lines/Drains/Airways       Peripheral Intravenous Line  Duration                  Peripheral IV - Single Lumen 04/30/22 1440 20 G Anterior;Proximal;Right Forearm <1 day                    Significant Labs:    CBC/Anemia Profile:  Recent Labs   Lab 04/30/22  1503   WBC 10.64   HGB 16.0   HCT 45.8      MCV 86.6   RDW 12.6        Chemistries:  Recent Labs   Lab 04/30/22  1503      K 4.1      CO2 14*   BUN 7   CREATININE 2.08*   CALCIUM 9.8   ALBUMIN 3.7   PROT 7.2   BILITOT 0.7   ALKPHOS 95   ALT 27   AST 23   MG 3.2*       Recent Lab Results         04/30/22  1507   04/30/22  1503        Influenza B, Molecular Negative         Benzodiazepines Positive         Cocaine Negative         BARBITURATES Negative         Albumin/Globulin Ratio   1.1       Acetaminophen (Tylenol), Serum   <2       Albumin    3.7       Alcohol, Serum   Not Detected       Alkaline Phosphatase   95       ALT   27       Amphetamine Positive         Anion Gap   29       Appearance, UA Cloudy         AST   23       Bacteria, UA Few         Baso #   0.08       Basophil %   0.8       Bilirubin (UA) Negative         BILIRUBIN TOTAL   0.7       BUN   7       BUN/CREAT RATIO   3       Calcium   9.8       Cannabinoid Scrn, Ur Positive         Chloride   102       CO2   14       Color, UA Yellow         COVID-19 Ag Negative         Creatinine   2.08       Differential Type   Auto       eGFR if non    41       Eos #   0.29       Eosinophil %   2.7       Globulin, Total   3.5       Glucose   133       Glucose, UA Negative         Hematocrit   45.8       Hemoglobin   16.0       Immature Grans (Abs)   0.30       Immature Granulocytes   2.8       Influenza A Negative         Ketones, UA Trace         Leukocytes, UA Negative         Lymph #   1.59       Lymph %   14.9       Magnesium   3.2       MCH   30.2       MCHC   34.9       MCV   86.6       Mono #   0.81       Mono %   7.6       MPV   9.8       Mucus, UA Few         Neutrophils, Abs   7.57       Neutrophils Relative   71.2       NITRITE UA Negative         nRBC   0.0       NUCLEATED RBC ABSOLUTE   0.00       Occult Blood UA Moderate         Opiate Scrn, Ur Negative         pH, UA 7.5         Phencyclidine Negative         Platelets   314       Potassium   4.1       PROTEIN TOTAL   7.2       Protein,           RBC   5.29       RBC, UA 5-10         RDW   12.6       Salicylate Lvl   3.7       Sodium   141       Specific Gravity, UA 1.020         Squam Epithel, UA Few         UROBILINOGEN UA 0.2         WBC, UA 0-5         WBC   10.64               Significant Imaging:   I have reviewed all pertinent imaging results/findings within the past 24 hours.

## 2022-04-30 NOTE — ASSESSMENT & PLAN NOTE
I think this is probably due to dehydration when hydrating tonight and see how he does repeat lab in the morning is not better look for secondary causes

## 2022-05-01 LAB
ANION GAP SERPL CALCULATED.3IONS-SCNC: 12 MMOL/L (ref 7–16)
BUN SERPL-MCNC: 12 MG/DL (ref 7–18)
BUN/CREAT SERPL: 4 (ref 6–20)
CALCIUM SERPL-MCNC: 8.4 MG/DL (ref 8.5–10.1)
CHLORIDE SERPL-SCNC: 109 MMOL/L (ref 98–107)
CO2 SERPL-SCNC: 21 MMOL/L (ref 21–32)
CREAT SERPL-MCNC: 2.89 MG/DL (ref 0.7–1.3)
GLUCOSE SERPL-MCNC: 90 MG/DL (ref 74–106)
POTASSIUM SERPL-SCNC: 3.6 MMOL/L (ref 3.5–5.1)
SODIUM SERPL-SCNC: 138 MMOL/L (ref 136–145)

## 2022-05-01 PROCEDURE — 99232 SBSQ HOSP IP/OBS MODERATE 35: CPT | Mod: ,,, | Performed by: INTERNAL MEDICINE

## 2022-05-01 PROCEDURE — 25000003 PHARM REV CODE 250: Performed by: INTERNAL MEDICINE

## 2022-05-01 PROCEDURE — 63600175 PHARM REV CODE 636 W HCPCS: Performed by: INTERNAL MEDICINE

## 2022-05-01 PROCEDURE — 20000000 HC ICU ROOM

## 2022-05-01 PROCEDURE — 80048 BASIC METABOLIC PNL TOTAL CA: CPT | Performed by: INTERNAL MEDICINE

## 2022-05-01 PROCEDURE — 36415 COLL VENOUS BLD VENIPUNCTURE: CPT | Performed by: INTERNAL MEDICINE

## 2022-05-01 PROCEDURE — 99232 PR SUBSEQUENT HOSPITAL CARE,LEVL II: ICD-10-PCS | Mod: ,,, | Performed by: INTERNAL MEDICINE

## 2022-05-01 RX ORDER — HYDROCODONE BITARTRATE AND ACETAMINOPHEN 7.5; 325 MG/1; MG/1
1 TABLET ORAL EVERY 4 HOURS PRN
Status: DISCONTINUED | OUTPATIENT
Start: 2022-05-01 | End: 2022-05-06 | Stop reason: HOSPADM

## 2022-05-01 RX ORDER — PROMETHAZINE HYDROCHLORIDE 25 MG/ML
25 INJECTION, SOLUTION INTRAMUSCULAR; INTRAVENOUS EVERY 4 HOURS PRN
Status: DISCONTINUED | OUTPATIENT
Start: 2022-05-01 | End: 2022-05-06 | Stop reason: HOSPADM

## 2022-05-01 RX ORDER — IBUPROFEN 400 MG/1
400 TABLET ORAL EVERY 6 HOURS PRN
Status: DISCONTINUED | OUTPATIENT
Start: 2022-05-01 | End: 2022-05-02

## 2022-05-01 RX ADMIN — SODIUM CHLORIDE: 4.5 INJECTION, SOLUTION INTRAVENOUS at 10:05

## 2022-05-01 RX ADMIN — IBUPROFEN 400 MG: 400 TABLET ORAL at 05:05

## 2022-05-01 RX ADMIN — SODIUM CHLORIDE: 4.5 INJECTION, SOLUTION INTRAVENOUS at 06:05

## 2022-05-01 RX ADMIN — HYDROCODONE BITARTRATE AND ACETAMINOPHEN 1 TABLET: 7.5; 325 TABLET ORAL at 07:05

## 2022-05-01 RX ADMIN — SODIUM CHLORIDE: 4.5 INJECTION, SOLUTION INTRAVENOUS at 03:05

## 2022-05-01 RX ADMIN — IBUPROFEN 400 MG: 400 TABLET ORAL at 08:05

## 2022-05-01 RX ADMIN — PROMETHAZINE HYDROCHLORIDE 25 MG: 25 INJECTION INTRAMUSCULAR; INTRAVENOUS at 07:05

## 2022-05-01 NOTE — PROGRESS NOTES
[facility]  Pulmonology  Progress Note    Patient Name: Jorge Riggins  MRN: [id number]  Admission Date: 4/30/2022  Hospital Length of Stay: 1 days  Code Status: Full Code  Attending Provider: Adán Smith MD  Primary Care Provider: Primary Doctor No   Principal Problem: Amphetamine delirium    Subjective:     Interval History:  Patient wants to go home awake alert no suicidal homicidal ideations    Objective:     Vital Signs (Most Recent):  Temp: 98.8 °F (37.1 °C) (05/01/22 0300)  Pulse: 92 (05/01/22 0630)  Resp: (!) 23 (05/01/22 0630)  BP: 136/83 (05/01/22 0600)  SpO2: 99 % (05/01/22 0630)   Vital Signs (24h Range):  Temp:  [98.2 °F (36.8 °C)-101.5 °F (38.6 °C)] 98.8 °F (37.1 °C)  Pulse:  [] 92  Resp:  [10-52] 23  SpO2:  [70 %-100 %] 99 %  BP: (109-243)/() 136/83     Weight: 92.8 kg (204 lb 9.4 oz)  Body mass index is 31.11 kg/m².      Intake/Output Summary (Last 24 hours) at 5/1/2022 0701  Last data filed at 5/1/2022 0600  Gross per 24 hour   Intake 2722.92 ml   Output --   Net 2722.92 ml       Physical Exam  Vitals reviewed.   Constitutional:       Appearance: Normal appearance.      Interventions: He is not intubated.  HENT:      Head: Normocephalic and atraumatic.      Nose: Nose normal.      Mouth/Throat:      Mouth: Mucous membranes are dry.      Pharynx: Oropharynx is clear.   Eyes:      Extraocular Movements: Extraocular movements intact.      Conjunctiva/sclera: Conjunctivae normal.      Pupils: Pupils are equal, round, and reactive to light.   Cardiovascular:      Rate and Rhythm: Normal rate.      Heart sounds: Normal heart sounds. No murmur heard.  Pulmonary:      Effort: Pulmonary effort is normal. He is not intubated.      Breath sounds: Normal breath sounds.   Abdominal:      General: Abdomen is flat. Bowel sounds are normal.      Palpations: Abdomen is soft.   Musculoskeletal:         General: Normal range of motion.      Cervical back: Normal range of motion  and neck supple.      Right lower leg: No edema.      Left lower leg: No edema.   Skin:     General: Skin is warm and dry.      Capillary Refill: Capillary refill takes less than 2 seconds.   Neurological:      General: No focal deficit present.      Mental Status: He is alert and oriented to person, place, and time.   Psychiatric:         Mood and Affect: Mood normal.         Behavior: Behavior normal.       Vents:       Lines/Drains/Airways       Peripheral Intravenous Line  Duration                  Peripheral IV - Single Lumen 04/30/22 1440 20 G Anterior;Proximal;Right Forearm <1 day                    Significant Labs:    CBC/Anemia Profile:  Recent Labs   Lab 04/30/22  1503   WBC 10.64   HGB 16.0   HCT 45.8      MCV 86.6   RDW 12.6        Chemistries:  Recent Labs   Lab 04/30/22  1503 05/01/22  0323    138   K 4.1 3.6    109*   CO2 14* 21   BUN 7 12   CREATININE 2.08* 2.89*   CALCIUM 9.8 8.4*   ALBUMIN 3.7  --    PROT 7.2  --    BILITOT 0.7  --    ALKPHOS 95  --    ALT 27  --    AST 23  --    MG 3.2*  --        Recent Lab Results         05/01/22  0323   04/30/22  1507   04/30/22  1503        Influenza B, Molecular   Negative         Benzodiazepines   Positive         Cocaine   Negative         BARBITURATES   Negative         Albumin/Globulin Ratio     1.1       Acetaminophen (Tylenol), Serum     <2       Albumin     3.7       Alcohol, Serum     Not Detected       Alkaline Phosphatase     95       ALT     27       Amphetamine   Positive         Anion Gap 12     29       Appearance, UA   Cloudy         AST     23       Bacteria, UA   Few         Baso #     0.08       Basophil %     0.8       Bilirubin (UA)   Negative         BILIRUBIN TOTAL     0.7       BUN 12     7       BUN/CREAT RATIO 4     3       Calcium 8.4     9.8       Cannabinoid Scrn, Ur   Positive         Chloride 109     102       CO2 21     14       Color, UA   Yellow         COVID-19 Ag   Negative         CPK     757        Creatinine 2.89     2.08       Differential Type     Auto       eGFR if non  28     41       Eos #     0.29       Eosinophil %     2.7       Globulin, Total     3.5       Glucose 90     133       Glucose, UA   Negative         Hematocrit     45.8       Hemoglobin     16.0       Immature Grans (Abs)     0.30       Immature Granulocytes     2.8       Influenza A   Negative         Ketones, UA   Trace         Leukocytes, UA   Negative         Lymph #     1.59       Lymph %     14.9       Magnesium     3.2       MCH     30.2       MCHC     34.9       MCV     86.6       Mono #     0.81       Mono %     7.6       MPV     9.8       Mucus, UA   Few         Neutrophils, Abs     7.57       Neutrophils Relative     71.2       NITRITE UA   Negative         nRBC     0.0       NUCLEATED RBC ABSOLUTE     0.00       Occult Blood UA   Moderate         Opiate Scrn, Ur   Negative         pH, UA   7.5         Phencyclidine   Negative         Platelets     314       Potassium 3.6     4.1       PROTEIN TOTAL     7.2       Protein, UA   100          RBC     5.29       RBC, UA   5-10         RDW     12.6       Salicylate Lvl     3.7       Sodium 138     141       Specific Gravity, UA   1.020         Squam Epithel, UA   Few         UROBILINOGEN UA   0.2         WBC, UA   0-5         WBC     10.64               Significant Imaging:  I have reviewed all pertinent imaging results/findings within the past 24 hours.    Assessment/Plan:     * Amphetamine delirium  Resolved but need psychiatric evaluation    Sinus tachycardia  Improving    Fever  Resolved negative cultures come back stop antibiotics    SARA (acute kidney injury)  Looks a little worse today with a renal ultrasound continue hydration check labs in morning                 Adán Smith MD  Pulmonology  [facility] [place] ICU

## 2022-05-01 NOTE — PROGRESS NOTES
"Dr. Smith notified via secure chat in regards to the patient's left arm being swollen, tender to touch and difficult for him to extend. State's "9" on 1/10 scale was medicated with PRN ibuprofen 400mg.   "

## 2022-05-01 NOTE — SUBJECTIVE & OBJECTIVE
Interval History:  Patient wants to go home awake alert no suicidal homicidal ideations    Objective:     Vital Signs (Most Recent):  Temp: 98.8 °F (37.1 °C) (05/01/22 0300)  Pulse: 92 (05/01/22 0630)  Resp: (!) 23 (05/01/22 0630)  BP: 136/83 (05/01/22 0600)  SpO2: 99 % (05/01/22 0630)   Vital Signs (24h Range):  Temp:  [98.2 °F (36.8 °C)-101.5 °F (38.6 °C)] 98.8 °F (37.1 °C)  Pulse:  [] 92  Resp:  [10-52] 23  SpO2:  [70 %-100 %] 99 %  BP: (109-243)/() 136/83     Weight: 92.8 kg (204 lb 9.4 oz)  Body mass index is 31.11 kg/m².      Intake/Output Summary (Last 24 hours) at 5/1/2022 0701  Last data filed at 5/1/2022 0600  Gross per 24 hour   Intake 2722.92 ml   Output --   Net 2722.92 ml       Physical Exam  Vitals reviewed.   Constitutional:       Appearance: Normal appearance.      Interventions: He is not intubated.  HENT:      Head: Normocephalic and atraumatic.      Nose: Nose normal.      Mouth/Throat:      Mouth: Mucous membranes are dry.      Pharynx: Oropharynx is clear.   Eyes:      Extraocular Movements: Extraocular movements intact.      Conjunctiva/sclera: Conjunctivae normal.      Pupils: Pupils are equal, round, and reactive to light.   Cardiovascular:      Rate and Rhythm: Normal rate.      Heart sounds: Normal heart sounds. No murmur heard.  Pulmonary:      Effort: Pulmonary effort is normal. He is not intubated.      Breath sounds: Normal breath sounds.   Abdominal:      General: Abdomen is flat. Bowel sounds are normal.      Palpations: Abdomen is soft.   Musculoskeletal:         General: Normal range of motion.      Cervical back: Normal range of motion and neck supple.      Right lower leg: No edema.      Left lower leg: No edema.   Skin:     General: Skin is warm and dry.      Capillary Refill: Capillary refill takes less than 2 seconds.   Neurological:      General: No focal deficit present.      Mental Status: He is alert and oriented to person, place, and time.   Psychiatric:          Mood and Affect: Mood normal.         Behavior: Behavior normal.       Vents:       Lines/Drains/Airways       Peripheral Intravenous Line  Duration                  Peripheral IV - Single Lumen 04/30/22 1440 20 G Anterior;Proximal;Right Forearm <1 day                    Significant Labs:    CBC/Anemia Profile:  Recent Labs   Lab 04/30/22  1503   WBC 10.64   HGB 16.0   HCT 45.8      MCV 86.6   RDW 12.6        Chemistries:  Recent Labs   Lab 04/30/22  1503 05/01/22  0323    138   K 4.1 3.6    109*   CO2 14* 21   BUN 7 12   CREATININE 2.08* 2.89*   CALCIUM 9.8 8.4*   ALBUMIN 3.7  --    PROT 7.2  --    BILITOT 0.7  --    ALKPHOS 95  --    ALT 27  --    AST 23  --    MG 3.2*  --        Recent Lab Results         05/01/22  0323   04/30/22  1507   04/30/22  1503        Influenza B, Molecular   Negative         Benzodiazepines   Positive         Cocaine   Negative         BARBITURATES   Negative         Albumin/Globulin Ratio     1.1       Acetaminophen (Tylenol), Serum     <2       Albumin     3.7       Alcohol, Serum     Not Detected       Alkaline Phosphatase     95       ALT     27       Amphetamine   Positive         Anion Gap 12     29       Appearance, UA   Cloudy         AST     23       Bacteria, UA   Few         Baso #     0.08       Basophil %     0.8       Bilirubin (UA)   Negative         BILIRUBIN TOTAL     0.7       BUN 12     7       BUN/CREAT RATIO 4     3       Calcium 8.4     9.8       Cannabinoid Scrn, Ur   Positive         Chloride 109     102       CO2 21     14       Color, UA   Yellow         COVID-19 Ag   Negative         CPK     757       Creatinine 2.89     2.08       Differential Type     Auto       eGFR if non  28     41       Eos #     0.29       Eosinophil %     2.7       Globulin, Total     3.5       Glucose 90     133       Glucose, UA   Negative         Hematocrit     45.8       Hemoglobin     16.0       Immature Grans (Abs)     0.30       Immature  Granulocytes     2.8       Influenza A   Negative         Ketones, UA   Trace         Leukocytes, UA   Negative         Lymph #     1.59       Lymph %     14.9       Magnesium     3.2       MCH     30.2       MCHC     34.9       MCV     86.6       Mono #     0.81       Mono %     7.6       MPV     9.8       Mucus, UA   Few         Neutrophils, Abs     7.57       Neutrophils Relative     71.2       NITRITE UA   Negative         nRBC     0.0       NUCLEATED RBC ABSOLUTE     0.00       Occult Blood UA   Moderate         Opiate Scrn, Ur   Negative         pH, UA   7.5         Phencyclidine   Negative         Platelets     314       Potassium 3.6     4.1       PROTEIN TOTAL     7.2       Protein, UA   100          RBC     5.29       RBC, UA   5-10         RDW     12.6       Salicylate Lvl     3.7       Sodium 138     141       Specific Gravity, UA   1.020         Squam Epithel, UA   Few         UROBILINOGEN UA   0.2         WBC, UA   0-5         WBC     10.64               Significant Imaging:  I have reviewed all pertinent imaging results/findings within the past 24 hours.

## 2022-05-01 NOTE — PROGRESS NOTES
Dr. Jeffries notified via secure chat in regards to patient's increase in pain in left elbow and numerous vomiting episodes. See new orders.

## 2022-05-01 NOTE — PLAN OF CARE
Problem: Fall Injury Risk  Goal: Absence of Fall and Fall-Related Injury  Outcome: Ongoing, Progressing  Intervention: Identify and Manage Contributors  Flowsheets (Taken 5/1/2022 0521)  Self-Care Promotion:   independence encouraged   safe use of adaptive equipment encouraged  Medication Review/Management: medications reviewed  Intervention: Promote Injury-Free Environment  Flowsheets (Taken 5/1/2022 0521)  Safety Promotion/Fall Prevention:   assistive device/personal item within reach   bed alarm set   side rails raised x 2

## 2022-05-01 NOTE — PROGRESS NOTES
Patient vomit about 200mL of undigested food and BP has increased over the last 30 minutes (currently . Dr. Smith notified via secure chat awaiting response.

## 2022-05-01 NOTE — PLAN OF CARE
[facility]  Initial Discharge Assessment       Primary Care Provider: Primary Doctor No    Admission Diagnosis: Suicidal ideation [R45.851]  Amphetamine delirium [F15.921]  Injury [T14.90XA]  Tachycardia [R00.0]  Fever [R50.9]  Pneumonia of lower lobe due to infectious organism, unspecified laterality [J18.9]  Methamphetamine intoxication [F15.929]    Admission Date: 4/30/2022  Expected Discharge Date:     Discharge Barriers Identified: (P) Unisured, Mental illness    Payor: /     Extended Emergency Contact Information  Primary Emergency Contact: EZEQUIEL SIDDIQI  Mobile Phone: [phone]  Relation: Mother  Preferred language: English   needed? No    Discharge Plan A: (P) Psychiatric hospital  Discharge Plan B: (P) Home    No Pharmacies Listed    Initial Assessment (most recent)       Adult Discharge Assessment - 05/01/22 1248          Discharge Assessment    Source of Information family (P)      Lives With alone (P)      Do you expect to return to your current living situation? Other (see comments) (P)    Mother would like psych evaluation for inpatient psychiatric treatment    Do you have help at home or someone to help you manage your care at home? Yes (P)      Who are your caregiver(s) and their phone number(s)? Ezequiel Siddiqi (Mother) [phone] (P)      Equipment Currently Used at Home none (P)      Patient currently being followed by outpatient case management? No (P)      Do you currently have service(s) that help you manage your care at home? No (P)      Who is going to help you get home at discharge? Mother (P)      Discharge Plan A Psychiatric hospital (P)      Discharge Plan B Home (P)      DME Needed Upon Discharge  none (P)      Discharge Plan discussed with: Parent(s) (P)      Name(s) and Number(s) Ezequiel Siddiqi (Mother) [phone] (P)      Discharge Barriers Identified Unisured;Mental illness (P)                  SW consulted for Discharge Planning - psychiatric  evaluation.  Spoke with pt's mother, Chely Siddiqi, by phone who is agreeable to a psychiatric eval as she reports pt needs psychiatric treatment due to mental illness. Mother reports pt is currently prescribed a monthly injection, Invega Sustenna, and is scheduled for his next injection 05/02/22.  Per mother, pt lives alone, has no home health and uses no DME. SW will complete referral for psych eval in am.  SW following.

## 2022-05-01 NOTE — PLAN OF CARE
Problem: Fall Injury Risk  Goal: Absence of Fall and Fall-Related Injury  Outcome: Ongoing, Progressing     Problem: Adult Inpatient Plan of Care  Goal: Plan of Care Review  Outcome: Ongoing, Progressing  Goal: Patient-Specific Goal (Individualized)  Outcome: Ongoing, Progressing  Goal: Absence of Hospital-Acquired Illness or Injury  Outcome: Ongoing, Progressing  Goal: Optimal Comfort and Wellbeing  Outcome: Ongoing, Progressing  Goal: Readiness for Transition of Care  Outcome: Ongoing, Progressing     Problem: Fluid and Electrolyte Imbalance (Acute Kidney Injury/Impairment)  Goal: Fluid and Electrolyte Balance  Outcome: Ongoing, Progressing     Problem: Oral Intake Inadequate (Acute Kidney Injury/Impairment)  Goal: Optimal Nutrition Intake  Outcome: Ongoing, Progressing     Problem: Renal Function Impairment (Acute Kidney Injury/Impairment)  Goal: Effective Renal Function  Outcome: Ongoing, Progressing     Problem: Impaired Wound Healing  Goal: Optimal Wound Healing  Outcome: Ongoing, Progressing     Patient continues to receive 1/2 NS at 125mL/hr. Complains of pain in right and left arm. Left arm is more swollen and tender to touch and move. Dr. Smith made aware. Possible Xray will be ordered of left elbow. Patient changes positions independently. All noted wounds are left open to air with no drainage noted. Patient has had 2 unmeasured urine occurrences this shift.

## 2022-05-01 NOTE — PROGRESS NOTES
Initial assessment:    Patient noted in bed with pleasant attitude, AAOx4, able to make needs known. VSS, sats 99% on RA, complains of pain in right elbow states 7 on 1/10 scale. Dr. Smith notified via instant chat, for some PRN pain meds. 20 gauge to right elbow noted infusing 1/2 MS at 125mL/hr, no warmth, edema or erythema present at site. Refuse to wear bilateral SCDs. This nurse educated patient on the importance of wearing the SCDs but patient continue to refuse.

## 2022-05-01 NOTE — PROGRESS NOTES
Patient's mom Chely Preston at bed would like for the doctor to call her in the morning in regards to the kidney US that was done today.

## 2022-05-02 LAB
ANION GAP SERPL CALCULATED.3IONS-SCNC: 16 MMOL/L (ref 7–16)
BUN SERPL-MCNC: 16 MG/DL (ref 7–18)
BUN/CREAT SERPL: 3 (ref 6–20)
CALCIUM SERPL-MCNC: 7.7 MG/DL (ref 8.5–10.1)
CHLORIDE SERPL-SCNC: 106 MMOL/L (ref 98–107)
CK SERPL-CCNC: ABNORMAL U/L (ref 39–308)
CO2 SERPL-SCNC: 17 MMOL/L (ref 21–32)
CREAT SERPL-MCNC: 5.99 MG/DL (ref 0.7–1.3)
CREAT UR-MCNC: 43 MG/DL (ref 39–259)
GLUCOSE SERPL-MCNC: 78 MG/DL (ref 74–106)
POTASSIUM SERPL-SCNC: 3.7 MMOL/L (ref 3.5–5.1)
SODIUM SERPL-SCNC: 135 MMOL/L (ref 136–145)
SODIUM UR-SCNC: 36 MMOL/L (ref 40–220)

## 2022-05-02 PROCEDURE — 25000003 PHARM REV CODE 250: Performed by: INTERNAL MEDICINE

## 2022-05-02 PROCEDURE — 80048 BASIC METABOLIC PNL TOTAL CA: CPT | Performed by: INTERNAL MEDICINE

## 2022-05-02 PROCEDURE — 99233 PR SUBSEQUENT HOSPITAL CARE,LEVL III: ICD-10-PCS | Mod: ,,, | Performed by: INTERNAL MEDICINE

## 2022-05-02 PROCEDURE — 63600175 PHARM REV CODE 636 W HCPCS: Performed by: INTERNAL MEDICINE

## 2022-05-02 PROCEDURE — 36415 COLL VENOUS BLD VENIPUNCTURE: CPT | Performed by: INTERNAL MEDICINE

## 2022-05-02 PROCEDURE — 82570 ASSAY OF URINE CREATININE: CPT | Performed by: INTERNAL MEDICINE

## 2022-05-02 PROCEDURE — 84300 ASSAY OF URINE SODIUM: CPT | Performed by: INTERNAL MEDICINE

## 2022-05-02 PROCEDURE — 99233 SBSQ HOSP IP/OBS HIGH 50: CPT | Mod: ,,, | Performed by: INTERNAL MEDICINE

## 2022-05-02 PROCEDURE — 82550 ASSAY OF CK (CPK): CPT | Performed by: INTERNAL MEDICINE

## 2022-05-02 PROCEDURE — 20000000 HC ICU ROOM

## 2022-05-02 RX ORDER — MUPIROCIN 20 MG/G
OINTMENT TOPICAL 2 TIMES DAILY
Status: DISCONTINUED | OUTPATIENT
Start: 2022-05-02 | End: 2022-05-06 | Stop reason: HOSPADM

## 2022-05-02 RX ORDER — PALIPERIDONE PALMITATE 234 MG/1.5ML
INJECTION INTRAMUSCULAR
COMMUNITY
Start: 2022-01-07

## 2022-05-02 RX ADMIN — SODIUM CHLORIDE: 4.5 INJECTION, SOLUTION INTRAVENOUS at 06:05

## 2022-05-02 RX ADMIN — HYDROCODONE BITARTRATE AND ACETAMINOPHEN 1 TABLET: 7.5; 325 TABLET ORAL at 08:05

## 2022-05-02 RX ADMIN — MUPIROCIN: 20 OINTMENT TOPICAL at 08:05

## 2022-05-02 RX ADMIN — SODIUM CHLORIDE: 4.5 INJECTION, SOLUTION INTRAVENOUS at 09:05

## 2022-05-02 RX ADMIN — PROMETHAZINE HYDROCHLORIDE 25 MG: 25 INJECTION INTRAMUSCULAR; INTRAVENOUS at 05:05

## 2022-05-02 RX ADMIN — SODIUM CHLORIDE: 4.5 INJECTION, SOLUTION INTRAVENOUS at 01:05

## 2022-05-02 RX ADMIN — SODIUM BICARBONATE: 84 INJECTION, SOLUTION INTRAVENOUS at 08:05

## 2022-05-02 NOTE — PLAN OF CARE
Problem: Adult Inpatient Plan of Care  Goal: Plan of Care Review  Flowsheets (Taken 5/2/2022 9404)  Plan of Care Reviewed With: patient  Goal: Patient-Specific Goal (Individualized)  Flowsheets (Taken 5/2/2022 0454)  Anxieties, Fears or Concerns: none  Individualized Care Needs: none

## 2022-05-02 NOTE — PLAN OF CARE
Problem: Fall Injury Risk  Goal: Absence of Fall and Fall-Related Injury  Outcome: Ongoing, Progressing     Problem: Adult Inpatient Plan of Care  Goal: Plan of Care Review  Outcome: Ongoing, Progressing  Goal: Patient-Specific Goal (Individualized)  Outcome: Ongoing, Progressing  Goal: Absence of Hospital-Acquired Illness or Injury  Outcome: Ongoing, Progressing  Goal: Optimal Comfort and Wellbeing  Outcome: Ongoing, Progressing  Goal: Readiness for Transition of Care  Outcome: Ongoing, Progressing     Problem: Fluid and Electrolyte Imbalance (Acute Kidney Injury/Impairment)  Goal: Fluid and Electrolyte Balance  Outcome: Ongoing, Progressing     Problem: Oral Intake Inadequate (Acute Kidney Injury/Impairment)  Goal: Optimal Nutrition Intake  Outcome: Ongoing, Progressing     Problem: Renal Function Impairment (Acute Kidney Injury/Impairment)  Goal: Effective Renal Function  Outcome: Ongoing, Progressing     Problem: Impaired Wound Healing  Goal: Optimal Wound Healing  Outcome: Ongoing, Progressing

## 2022-05-02 NOTE — PROGRESS NOTES
[facility]  Pulmonology  Progress Note    Patient Name: Jorge Riggins  MRN: [id number]  Admission Date: 4/30/2022  Hospital Length of Stay: 2 days  Code Status: Full Code  Attending Provider: Curry Lott DO  Primary Care Provider: Primary Doctor No   Principal Problem: Amphetamine delirium    Subjective:     Interval History: No acute events overnight. The patient is currently resting comfortably. He is afebrile and vital signs are stable. Creatinine is worsening.    Objective:     Vital Signs (Most Recent):  Temp: 98.2 °F (36.8 °C) (05/02/22 0701)  Pulse: 69 (05/02/22 1000)  Resp: 16 (05/02/22 1000)  BP: (!) 136/94 (05/02/22 1000)  SpO2: 99 % (05/02/22 1000)   Vital Signs (24h Range):  Temp:  [97.8 °F (36.6 °C)-98.8 °F (37.1 °C)] 98.2 °F (36.8 °C)  Pulse:  [] 69  Resp:  [12-31] 16  SpO2:  [92 %-100 %] 99 %  BP: (120-182)/() 136/94     Weight: 92.8 kg (204 lb 9.4 oz)  Body mass index is 31.11 kg/m².      Intake/Output Summary (Last 24 hours) at 5/2/2022 1012  Last data filed at 5/2/2022 1000  Gross per 24 hour   Intake 3286.83 ml   Output --   Net 3286.83 ml       Physical Exam  Vitals and nursing note reviewed.   Constitutional:       General: He is not in acute distress.     Appearance: Normal appearance. He is not ill-appearing.   HENT:      Head: Normocephalic and atraumatic.      Right Ear: External ear normal.      Left Ear: External ear normal.      Nose: Nose normal.      Mouth/Throat:      Pharynx: Oropharynx is clear.   Eyes:      Extraocular Movements: Extraocular movements intact.      Conjunctiva/sclera: Conjunctivae normal.      Pupils: Pupils are equal, round, and reactive to light.   Cardiovascular:      Rate and Rhythm: Normal rate and regular rhythm.      Pulses: Normal pulses.      Heart sounds: Normal heart sounds. No murmur heard.  Pulmonary:      Effort: No respiratory distress.      Breath sounds: Normal breath sounds. No wheezing or rales.   Abdominal:       General: Bowel sounds are normal.      Palpations: Abdomen is soft.   Musculoskeletal:         General: Swelling, tenderness and signs of injury present. Normal range of motion.      Cervical back: Normal range of motion and neck supple.      Right lower leg: No edema.      Left lower leg: No edema.   Skin:     General: Skin is warm and dry.      Capillary Refill: Capillary refill takes less than 2 seconds.      Coloration: Skin is not pale.   Neurological:      General: No focal deficit present.      Mental Status: He is alert and oriented to person, place, and time. Mental status is at baseline.      Cranial Nerves: No cranial nerve deficit.      Motor: No weakness.   Psychiatric:         Mood and Affect: Mood normal.         Behavior: Behavior normal.       Vents:       Lines/Drains/Airways       Peripheral Intravenous Line  Duration                  Peripheral IV - Single Lumen 04/30/22 1440 20 G Anterior;Proximal;Right Forearm 1 day                    Significant Labs:    CBC/Anemia Profile:  Recent Labs   Lab 04/30/22  1503   WBC 10.64   HGB 16.0   HCT 45.8      MCV 86.6   RDW 12.6        Chemistries:  Recent Labs   Lab 04/30/22  1503 05/01/22  0323 05/02/22  0248    138 135*   K 4.1 3.6 3.7    109* 106   CO2 14* 21 17*   BUN 7 12 16   CREATININE 2.08* 2.89* 5.99*   CALCIUM 9.8 8.4* 7.7*   ALBUMIN 3.7  --   --    PROT 7.2  --   --    BILITOT 0.7  --   --    ALKPHOS 95  --   --    ALT 27  --   --    AST 23  --   --    MG 3.2*  --   --        All pertinent labs within the past 24 hours have been reviewed.    Significant Imaging:  I have reviewed all pertinent imaging results/findings within the past 24 hours.    Assessment/Plan:     * Amphetamine delirium  He is now at his baseline  Once renal function improves we will have patient evaluated by [facility]    Sinus tachycardia  Improving  Felt to be related to drug use    Fever  No more fever  Cultures with no growth  Antibiotics  discontinued    SARA (acute kidney injury)  Creatinine continues to worsen. 2.89 to 5.99. He is making urine but it has not been recorded so far.  Renal US without hydronephrosis  Continue to hydrate  I am going to check urine sodium and urine creatinine  Continue to monitor creatinine and UOP                 Curry Lott, DO  Pulmonology  [facility]

## 2022-05-02 NOTE — PLAN OF CARE
Spoke to physician in huddle this am, regarding patients d/c needs. He recommends drug/alcohol treatment at d/c-not psych. However, patient is not medically ready for d/c. SW to discuss drug/alcohol tx once patient is more stable for d/c. Follow

## 2022-05-02 NOTE — SUBJECTIVE & OBJECTIVE
Interval History: No acute events overnight. The patient is currently resting comfortably. He is afebrile and vital signs are stable. Creatinine is worsening.    Objective:     Vital Signs (Most Recent):  Temp: 98.2 °F (36.8 °C) (05/02/22 0701)  Pulse: 69 (05/02/22 1000)  Resp: 16 (05/02/22 1000)  BP: (!) 136/94 (05/02/22 1000)  SpO2: 99 % (05/02/22 1000)   Vital Signs (24h Range):  Temp:  [97.8 °F (36.6 °C)-98.8 °F (37.1 °C)] 98.2 °F (36.8 °C)  Pulse:  [] 69  Resp:  [12-31] 16  SpO2:  [92 %-100 %] 99 %  BP: (120-182)/() 136/94     Weight: 92.8 kg (204 lb 9.4 oz)  Body mass index is 31.11 kg/m².      Intake/Output Summary (Last 24 hours) at 5/2/2022 1012  Last data filed at 5/2/2022 1000  Gross per 24 hour   Intake 3286.83 ml   Output --   Net 3286.83 ml       Physical Exam  Vitals and nursing note reviewed.   Constitutional:       General: He is not in acute distress.     Appearance: Normal appearance. He is not ill-appearing.   HENT:      Head: Normocephalic and atraumatic.      Right Ear: External ear normal.      Left Ear: External ear normal.      Nose: Nose normal.      Mouth/Throat:      Pharynx: Oropharynx is clear.   Eyes:      Extraocular Movements: Extraocular movements intact.      Conjunctiva/sclera: Conjunctivae normal.      Pupils: Pupils are equal, round, and reactive to light.   Cardiovascular:      Rate and Rhythm: Normal rate and regular rhythm.      Pulses: Normal pulses.      Heart sounds: Normal heart sounds. No murmur heard.  Pulmonary:      Effort: No respiratory distress.      Breath sounds: Normal breath sounds. No wheezing or rales.   Abdominal:      General: Bowel sounds are normal.      Palpations: Abdomen is soft.   Musculoskeletal:         General: Swelling, tenderness and signs of injury present. Normal range of motion.      Cervical back: Normal range of motion and neck supple.      Right lower leg: No edema.      Left lower leg: No edema.   Skin:     General: Skin is  warm and dry.      Capillary Refill: Capillary refill takes less than 2 seconds.      Coloration: Skin is not pale.   Neurological:      General: No focal deficit present.      Mental Status: He is alert and oriented to person, place, and time. Mental status is at baseline.      Cranial Nerves: No cranial nerve deficit.      Motor: No weakness.   Psychiatric:         Mood and Affect: Mood normal.         Behavior: Behavior normal.       Vents:       Lines/Drains/Airways       Peripheral Intravenous Line  Duration                  Peripheral IV - Single Lumen 04/30/22 1440 20 G Anterior;Proximal;Right Forearm 1 day                    Significant Labs:    CBC/Anemia Profile:  Recent Labs   Lab 04/30/22  1503   WBC 10.64   HGB 16.0   HCT 45.8      MCV 86.6   RDW 12.6        Chemistries:  Recent Labs   Lab 04/30/22  1503 05/01/22  0323 05/02/22  0248    138 135*   K 4.1 3.6 3.7    109* 106   CO2 14* 21 17*   BUN 7 12 16   CREATININE 2.08* 2.89* 5.99*   CALCIUM 9.8 8.4* 7.7*   ALBUMIN 3.7  --   --    PROT 7.2  --   --    BILITOT 0.7  --   --    ALKPHOS 95  --   --    ALT 27  --   --    AST 23  --   --    MG 3.2*  --   --        All pertinent labs within the past 24 hours have been reviewed.    Significant Imaging:  I have reviewed all pertinent imaging results/findings within the past 24 hours.

## 2022-05-02 NOTE — ASSESSMENT & PLAN NOTE
Creatinine continues to worsen. 2.89 to 5.99. He is making urine but it has not been recorded so far.  Renal US without hydronephrosis  Continue to hydrate  I am going to check urine sodium and urine creatinine  Continue to monitor creatinine and UOP

## 2022-05-02 NOTE — HOSPITAL COURSE
5/2- the patient is resting comfortably this am. He is awake, alert, and oriented x 3. Creatinine is up to 5.99. He is making urine but it has not been adequately recorded. Renal US with no hydronephrosis  5/3- Creatinine is up to 7.79. CK was 46,490 now down to 6833. UOP of 1570.

## 2022-05-02 NOTE — ASSESSMENT & PLAN NOTE
He is now at his baseline  Once renal function improves we will have patient evaluated by [facility]

## 2022-05-02 NOTE — NURSING
1105: spoke with pt's mother on phone. She notified me that pt was due to receive his psych med injection today. She asked if we could get it for him while he was in the hospital  1110: spoke with Joy at [facility] and she states that pt's last injection was on 4/4/2022. He can take injection up to 1 week before or  1 week after due date. Pt should be able to discharge and receive shot before time expires. Will also notify MD of this.pt voided and urine specimen take to lab  1358 CK came back 46,490. Dr Lott notified of this. Continues with iv fluids.  Pt asking why he has to stay in hospital.  Tried to explain about kidney status but not sure he understood  1442: notified dr mcclelland of consult request

## 2022-05-03 PROBLEM — M62.82 RHABDOMYOLYSIS: Status: ACTIVE | Noted: 2022-05-03

## 2022-05-03 LAB
ANION GAP SERPL CALCULATED.3IONS-SCNC: 13 MMOL/L (ref 7–16)
BUN SERPL-MCNC: 19 MG/DL (ref 7–18)
BUN/CREAT SERPL: 2 (ref 6–20)
CALCIUM SERPL-MCNC: 7.6 MG/DL (ref 8.5–10.1)
CHLORIDE SERPL-SCNC: 105 MMOL/L (ref 98–107)
CO2 SERPL-SCNC: 23 MMOL/L (ref 21–32)
CREAT SERPL-MCNC: 7.79 MG/DL (ref 0.7–1.3)
GLUCOSE SERPL-MCNC: 87 MG/DL (ref 74–106)
POTASSIUM SERPL-SCNC: 3.7 MMOL/L (ref 3.5–5.1)
SODIUM SERPL-SCNC: 137 MMOL/L (ref 136–145)
T4 FREE SERPL-MCNC: 1.03 NG/DL (ref 0.76–1.46)
TSH SERPL DL<=0.005 MIU/L-ACNC: 2.17 UIU/ML (ref 0.36–3.74)

## 2022-05-03 PROCEDURE — 36415 COLL VENOUS BLD VENIPUNCTURE: CPT | Performed by: INTERNAL MEDICINE

## 2022-05-03 PROCEDURE — 25000003 PHARM REV CODE 250: Performed by: INTERNAL MEDICINE

## 2022-05-03 PROCEDURE — 25000003 PHARM REV CODE 250: Performed by: HOSPITALIST

## 2022-05-03 PROCEDURE — 63600175 PHARM REV CODE 636 W HCPCS: Performed by: INTERNAL MEDICINE

## 2022-05-03 PROCEDURE — 80048 BASIC METABOLIC PNL TOTAL CA: CPT | Performed by: INTERNAL MEDICINE

## 2022-05-03 PROCEDURE — 11000001 HC ACUTE MED/SURG PRIVATE ROOM

## 2022-05-03 PROCEDURE — 82550 ASSAY OF CK (CPK): CPT | Performed by: INTERNAL MEDICINE

## 2022-05-03 PROCEDURE — 99233 PR SUBSEQUENT HOSPITAL CARE,LEVL III: ICD-10-PCS | Mod: ,,, | Performed by: INTERNAL MEDICINE

## 2022-05-03 PROCEDURE — 99233 SBSQ HOSP IP/OBS HIGH 50: CPT | Mod: ,,, | Performed by: INTERNAL MEDICINE

## 2022-05-03 RX ORDER — BISACODYL 5 MG
10 TABLET, DELAYED RELEASE (ENTERIC COATED) ORAL DAILY PRN
Status: DISCONTINUED | OUTPATIENT
Start: 2022-05-03 | End: 2022-05-06 | Stop reason: HOSPADM

## 2022-05-03 RX ORDER — ACETAMINOPHEN 500 MG
1000 TABLET ORAL EVERY 6 HOURS PRN
Status: DISCONTINUED | OUTPATIENT
Start: 2022-05-03 | End: 2022-05-06 | Stop reason: HOSPADM

## 2022-05-03 RX ORDER — SIMETHICONE 80 MG
1 TABLET,CHEWABLE ORAL 3 TIMES DAILY PRN
Status: DISCONTINUED | OUTPATIENT
Start: 2022-05-03 | End: 2022-05-06 | Stop reason: HOSPADM

## 2022-05-03 RX ORDER — TRAZODONE HYDROCHLORIDE 50 MG/1
50 TABLET ORAL NIGHTLY PRN
Status: DISCONTINUED | OUTPATIENT
Start: 2022-05-03 | End: 2022-05-06 | Stop reason: HOSPADM

## 2022-05-03 RX ORDER — GUAIFENESIN/DEXTROMETHORPHAN 100-10MG/5
10 SYRUP ORAL EVERY 6 HOURS PRN
Status: DISCONTINUED | OUTPATIENT
Start: 2022-05-03 | End: 2022-05-06 | Stop reason: HOSPADM

## 2022-05-03 RX ORDER — ONDANSETRON 2 MG/ML
8 INJECTION INTRAMUSCULAR; INTRAVENOUS EVERY 6 HOURS PRN
Status: DISCONTINUED | OUTPATIENT
Start: 2022-05-03 | End: 2022-05-06 | Stop reason: HOSPADM

## 2022-05-03 RX ORDER — METOCLOPRAMIDE HYDROCHLORIDE 5 MG/ML
5 INJECTION INTRAMUSCULAR; INTRAVENOUS
Status: DISCONTINUED | OUTPATIENT
Start: 2022-05-03 | End: 2022-05-06 | Stop reason: HOSPADM

## 2022-05-03 RX ADMIN — METOCLOPRAMIDE 5 MG: 5 INJECTION, SOLUTION INTRAMUSCULAR; INTRAVENOUS at 08:05

## 2022-05-03 RX ADMIN — TRAZODONE HYDROCHLORIDE 50 MG: 50 TABLET ORAL at 09:05

## 2022-05-03 RX ADMIN — METOCLOPRAMIDE 5 MG: 5 INJECTION, SOLUTION INTRAMUSCULAR; INTRAVENOUS at 04:05

## 2022-05-03 RX ADMIN — SODIUM BICARBONATE: 84 INJECTION, SOLUTION INTRAVENOUS at 09:05

## 2022-05-03 RX ADMIN — SODIUM BICARBONATE: 84 INJECTION, SOLUTION INTRAVENOUS at 04:05

## 2022-05-03 RX ADMIN — SODIUM BICARBONATE: 84 INJECTION, SOLUTION INTRAVENOUS at 02:05

## 2022-05-03 RX ADMIN — MUPIROCIN: 20 OINTMENT TOPICAL at 08:05

## 2022-05-03 RX ADMIN — HYDROCODONE BITARTRATE AND ACETAMINOPHEN 1 TABLET: 7.5; 325 TABLET ORAL at 08:05

## 2022-05-03 RX ADMIN — MUPIROCIN: 20 OINTMENT TOPICAL at 09:05

## 2022-05-03 NOTE — PROGRESS NOTES
[facility]  Pulmonology  Progress Note    Patient Name: Jorge Riggins  MRN: [id number]  Admission Date: 4/30/2022  Hospital Length of Stay: 3 days  Code Status: Full Code  Attending Provider: Curry Lott DO  Primary Care Provider: Primary Doctor No   Principal Problem: Amphetamine delirium    Subjective:     Interval History: No acute events overnight. The patient is currently resting comfortably. He is afebrile and vital signs are stable. Creatinine is up to 7.79 today. UOP  of 1570    Objective:     Vital Signs (Most Recent):  Temp: 97.8 °F (36.6 °C) (05/03/22 0703)  Pulse: 62 (05/03/22 1000)  Resp: 17 (05/03/22 1000)  BP: (!) 147/96 (05/03/22 1000)  SpO2: 97 % (05/03/22 1000)   Vital Signs (24h Range):  Temp:  [97.6 °F (36.4 °C)-99.3 °F (37.4 °C)] 97.8 °F (36.6 °C)  Pulse:  [62-85] 62  Resp:  [13-26] 17  SpO2:  [94 %-99 %] 97 %  BP: (123-149)/() 147/96     Weight: 96.8 kg (213 lb 6.5 oz)  Body mass index is 32.45 kg/m².      Intake/Output Summary (Last 24 hours) at 5/3/2022 1020  Last data filed at 5/3/2022 0800  Gross per 24 hour   Intake 2851.67 ml   Output 1570 ml   Net 1281.67 ml         Physical Exam  Vitals and nursing note reviewed.   Constitutional:       General: He is not in acute distress.     Appearance: Normal appearance. He is not ill-appearing.   HENT:      Head: Normocephalic and atraumatic.      Right Ear: External ear normal.      Left Ear: External ear normal.      Nose: Nose normal.      Mouth/Throat:      Pharynx: Oropharynx is clear.   Eyes:      Extraocular Movements: Extraocular movements intact.      Conjunctiva/sclera: Conjunctivae normal.      Pupils: Pupils are equal, round, and reactive to light.   Cardiovascular:      Rate and Rhythm: Normal rate and regular rhythm.      Pulses: Normal pulses.      Heart sounds: Normal heart sounds. No murmur heard.  Pulmonary:      Effort: No respiratory distress.      Breath sounds: Normal breath sounds. No  wheezing or rales.   Abdominal:      General: Bowel sounds are normal.      Palpations: Abdomen is soft.   Musculoskeletal:         General: Swelling, tenderness and signs of injury present. Normal range of motion.      Cervical back: Normal range of motion and neck supple.      Right lower leg: No edema.      Left lower leg: No edema.   Skin:     General: Skin is warm and dry.      Capillary Refill: Capillary refill takes less than 2 seconds.      Coloration: Skin is not pale.   Neurological:      General: No focal deficit present.      Mental Status: He is alert and oriented to person, place, and time. Mental status is at baseline.      Cranial Nerves: No cranial nerve deficit.      Motor: No weakness.   Psychiatric:         Mood and Affect: Mood normal.         Behavior: Behavior normal.       Vents:       Lines/Drains/Airways       Peripheral Intravenous Line  Duration                  Peripheral IV - Single Lumen 04/30/22 1440 20 G Anterior;Proximal;Right Forearm 2 days                    Significant Labs:    CBC/Anemia Profile:  No results for input(s): WBC, HGB, HCT, PLT, MCV, RDW, IRON, FERRITIN, RETIC, FOLATE, [id number], OCCULTBLOOD in the last 48 hours.       Chemistries:  Recent Labs   Lab 05/02/22  0248 05/03/22  0347   * 137   K 3.7 3.7    105   CO2 17* 23   BUN 16 19*   CREATININE 5.99* 7.79*   CALCIUM 7.7* 7.6*         All pertinent labs within the past 24 hours have been reviewed.    Significant Imaging:  I have reviewed all pertinent imaging results/findings within the past 24 hours.    Assessment/Plan:     * Amphetamine delirium  He is now at his baseline  Once renal function improves we will have patient evaluated by [facility]    Sinus tachycardia  Improved  Felt to be related to drug use    Fever  No more fever  Cultures with no growth  Antibiotics discontinued    SARA (acute kidney injury)  Creatinine continues to worsen. 2.89 to 5.99. He is making urine but it has not been recorded  so far.  Renal US without hydronephrosis  Continue to hydrate  I am going to check urine sodium and urine creatinine  Continue to monitor creatinine and UOP  5/3- CK was elevated at 46,000. Nephrology note reviewed and appreciated. Patient started on bicarbonate fluids.   Creatinine is 7.79 today. CK down to 6800  Continue to monitor UOP, bun, creatinine      Patient is hemodynamically stable. Will put floor orders in on patient.         Curry Lott, DO  Pulmonology  [facility]

## 2022-05-03 NOTE — PLAN OF CARE
[facility] [place] ICU  Initial Discharge Assessment       Primary Care Provider: Primary Doctor No    Admission Diagnosis: Suicidal ideation [R45.851]  Amphetamine delirium [F15.921]  Injury [T14.90XA]  Tachycardia [R00.0]  Fever [R50.9]  Pneumonia of lower lobe due to infectious organism, unspecified laterality [J18.9]  Methamphetamine intoxication [F15.929]    Admission Date: 4/30/2022  Expected Discharge Date:     Discharge Barriers Identified: None    Payor: /     Extended Emergency Contact Information  Primary Emergency Contact: EZEQUIEL SIDDIQI  Mobile Phone: [phone]  Relation: Mother  Preferred language: English   needed? No    Discharge Plan A: Home  Discharge Plan B: Psychiatric hospital (possible placment for drug/alcohol tx)    No Pharmacies Listed    Initial Assessment (most recent)     Adult Discharge Assessment - 05/03/22 1314        Discharge Assessment    Assessment Type Discharge Planning Assessment     Confirmed/corrected address, phone number and insurance Yes     Confirmed Demographics Correct on Facesheet     Source of Information family     If unable to respond/provide information was family/caregiver contacted? Yes     Contact Name/Number ezequiel Siddiqi [phone]     Lives With alone     Do you expect to return to your current living situation? Yes     Do you have help at home or someone to help you manage your care at home? No     Prior to hospitilization cognitive status: Unable to Assess     Current cognitive status: Unable to Assess     Dressing/Bathing Difficulty none     Home Layout Able to live on 1st floor     Equipment Currently Used at Home none     Readmission within 30 days? No     Patient currently being followed by outpatient case management? No     Do you currently have service(s) that help you manage your care at home? No     Are you on dialysis? No     Do you take coumadin? No     Discharge Plan A Home     Discharge Plan B Psychiatric hospital   possible  placment for drug/alcohol tx    DME Needed Upon Discharge  none     Discharge Plan discussed with: Parent(s)     Name(s) and Number(s) Chely Siddiqi     Discharge Barriers Identified None                 ss spoke with pt mother at this time, phoned pt cell phone no answer. Per mother pt lives home alone and plans to return when stable, however she want pt to attend inpt rehabilitaion for drug/alcohol abuse. When pt  Medically stable will get choice for facility choice. Per IDT meeting pt continues with IVF's and monitoring labs and urine output. Continue monitor cognitive status and TX PRN. Mother was verified as emergency contact. Denies DME at home denies HH. Will follow dc needs as arise.

## 2022-05-03 NOTE — SUBJECTIVE & OBJECTIVE
Interval History:  He is awake and alert.  He denies shortness of breath.  He has mild muscle soreness.    Review of patient's allergies indicates:  No Known Allergies  Current Facility-Administered Medications   Medication Frequency    HYDROcodone-acetaminophen 7.5-325 mg per tablet 1 tablet Q4H PRN    LORazepam injection 2 mg QID PRN    mupirocin 2 % ointment BID    promethazine injection 25 mg Q4H PRN    sodium bicarbonate 100 mEq in dextrose 5 % and 0.2 % NaCl 1,000 mL infusion Continuous    sodium chloride 0.9% flush 10 mL PRN       Objective:     Vital Signs (Most Recent):  Temp: 98.5 °F (36.9 °C) (05/03/22 1100)  Pulse: 62 (05/03/22 1000)  Resp: 17 (05/03/22 1000)  BP: (!) 147/96 (05/03/22 1000)  SpO2: 97 % (05/03/22 1000)   Vital Signs (24h Range):  Temp:  [97.8 °F (36.6 °C)-99.3 °F (37.4 °C)] 98.5 °F (36.9 °C)  Pulse:  [62-85] 62  Resp:  [13-26] 17  SpO2:  [94 %-99 %] 97 %  BP: (123-149)/() 147/96     Weight: 96.8 kg (213 lb 6.5 oz) (05/03/22 0525)  Body mass index is 32.45 kg/m².  Body surface area is 2.16 meters squared.    I/O last 3 completed shifts:  In: 4750.6 [I.V.:4750.6]  Out: 1570 [Urine:1570]    Physical Exam  Constitutional:       General: He is not in acute distress.  HENT:      Head: Normocephalic and atraumatic.   Eyes:      Pupils: Pupils are equal, round, and reactive to light.   Cardiovascular:      Rate and Rhythm: Normal rate and regular rhythm.      Heart sounds: No murmur heard.    No friction rub. No gallop.   Pulmonary:      Effort: No respiratory distress.      Breath sounds: Normal breath sounds.   Abdominal:      General: Bowel sounds are normal.      Tenderness: There is no abdominal tenderness. There is no guarding.   Musculoskeletal:      Right lower leg: No edema.      Left lower leg: No edema.   Neurological:      Mental Status: He is alert and oriented to person, place, and time.       Significant Labs:  BMP:   Recent Labs   Lab 04/30/22  1503 05/01/22  4759  05/03/22  0347   *   < > 87      < > 137   K 4.1   < > 3.7      < > 105   CO2 14*   < > 23   BUN 7   < > 19*   CREATININE 2.08*   < > 7.79*   CALCIUM 9.8   < > 7.6*   MG 3.2*  --   --     < > = values in this interval not displayed.     Cardiac Markers: No results for input(s): CKMB, TROPONINT, MYOGLOBIN in the last 168 hours.  CBC:   Recent Labs   Lab 04/30/22  1503   WBC 10.64   RBC 5.29   HGB 16.0   HCT 45.8      MCV 86.6   MCH 30.2   MCHC 34.9        Significant Imaging:

## 2022-05-03 NOTE — ASSESSMENT & PLAN NOTE
Creatinine continues to worsen. 2.89 to 5.99. He is making urine but it has not been recorded so far.  Renal US without hydronephrosis  Continue to hydrate  I am going to check urine sodium and urine creatinine  Continue to monitor creatinine and UOP  5/3- CK was elevated at 46,000. Nephrology note reviewed and appreciated. Patient started on bicarbonate fluids.   Creatinine is 7.79 today. CK down to 6800  Continue to monitor UOP, bun, creatinine

## 2022-05-03 NOTE — ASSESSMENT & PLAN NOTE
Creatinine has risen significantly from 1.3-5.99.  CPK has risen to 46,000.  This is consistent with rhabdomyolysis.  Creatinine is significantly higher.  However, urine output is improving.  CPK is falling.  Continue alkaline IV fluid

## 2022-05-03 NOTE — PLAN OF CARE
Problem: Fluid and Electrolyte Imbalance (Acute Kidney Injury/Impairment)  Goal: Fluid and Electrolyte Balance  Outcome: Ongoing, Progressing  Intervention: Monitor and Manage Fluid and Electrolyte Balance  Flowsheets (Taken 5/2/2022 1915)  Fluid/Electrolyte Management: fluids provided     Problem: Impaired Wound Healing  Goal: Optimal Wound Healing  Outcome: Ongoing, Progressing  Intervention: Promote Wound Healing  Flowsheets (Taken 5/2/2022 1915)  Oral Nutrition Promotion: physical activity promoted

## 2022-05-03 NOTE — SUBJECTIVE & OBJECTIVE
Interval History: No acute events overnight. The patient is currently resting comfortably. He is afebrile and vital signs are stable. Creatinine is up to 7.79 today. UOP  of 1570    Objective:     Vital Signs (Most Recent):  Temp: 97.8 °F (36.6 °C) (05/03/22 0703)  Pulse: 62 (05/03/22 1000)  Resp: 17 (05/03/22 1000)  BP: (!) 147/96 (05/03/22 1000)  SpO2: 97 % (05/03/22 1000)   Vital Signs (24h Range):  Temp:  [97.6 °F (36.4 °C)-99.3 °F (37.4 °C)] 97.8 °F (36.6 °C)  Pulse:  [62-85] 62  Resp:  [13-26] 17  SpO2:  [94 %-99 %] 97 %  BP: (123-149)/() 147/96     Weight: 96.8 kg (213 lb 6.5 oz)  Body mass index is 32.45 kg/m².      Intake/Output Summary (Last 24 hours) at 5/3/2022 1020  Last data filed at 5/3/2022 0800  Gross per 24 hour   Intake 2851.67 ml   Output 1570 ml   Net 1281.67 ml         Physical Exam  Vitals and nursing note reviewed.   Constitutional:       General: He is not in acute distress.     Appearance: Normal appearance. He is not ill-appearing.   HENT:      Head: Normocephalic and atraumatic.      Right Ear: External ear normal.      Left Ear: External ear normal.      Nose: Nose normal.      Mouth/Throat:      Pharynx: Oropharynx is clear.   Eyes:      Extraocular Movements: Extraocular movements intact.      Conjunctiva/sclera: Conjunctivae normal.      Pupils: Pupils are equal, round, and reactive to light.   Cardiovascular:      Rate and Rhythm: Normal rate and regular rhythm.      Pulses: Normal pulses.      Heart sounds: Normal heart sounds. No murmur heard.  Pulmonary:      Effort: No respiratory distress.      Breath sounds: Normal breath sounds. No wheezing or rales.   Abdominal:      General: Bowel sounds are normal.      Palpations: Abdomen is soft.   Musculoskeletal:         General: Swelling, tenderness and signs of injury present. Normal range of motion.      Cervical back: Normal range of motion and neck supple.      Right lower leg: No edema.      Left lower leg: No edema.    Skin:     General: Skin is warm and dry.      Capillary Refill: Capillary refill takes less than 2 seconds.      Coloration: Skin is not pale.   Neurological:      General: No focal deficit present.      Mental Status: He is alert and oriented to person, place, and time. Mental status is at baseline.      Cranial Nerves: No cranial nerve deficit.      Motor: No weakness.   Psychiatric:         Mood and Affect: Mood normal.         Behavior: Behavior normal.       Vents:       Lines/Drains/Airways       Peripheral Intravenous Line  Duration                  Peripheral IV - Single Lumen 04/30/22 1440 20 G Anterior;Proximal;Right Forearm 2 days                    Significant Labs:    CBC/Anemia Profile:  No results for input(s): WBC, HGB, HCT, PLT, MCV, RDW, IRON, FERRITIN, RETIC, FOLATE, [id number], OCCULTBLOOD in the last 48 hours.       Chemistries:  Recent Labs   Lab 05/02/22  0248 05/03/22  0347   * 137   K 3.7 3.7    105   CO2 17* 23   BUN 16 19*   CREATININE 5.99* 7.79*   CALCIUM 7.7* 7.6*         All pertinent labs within the past 24 hours have been reviewed.    Significant Imaging:  I have reviewed all pertinent imaging results/findings within the past 24 hours.

## 2022-05-03 NOTE — NURSING
0800 dr de santiago here, update given.  1030 dr mcclelland here. Update given.   1035 updated mom on progress. Order rec'd for transfer to floor. Await room.  1415 pt transferred via w/c to room 463. Mom notified of room number. Pt in no acute distress.   unknown

## 2022-05-03 NOTE — PROGRESS NOTES
[facility]  Nephrology  Progress Note    Patient Name: Jorge Riggins  MRN: [id number]  Admission Date: 4/30/2022  Hospital Length of Stay: 3 days  Attending Provider: Curry Lott DO   Primary Care Physician: Primary Doctor No  Principal Problem:Amphetamine delirium    Subjective:     HPI: 26-year-old man admitted yesterday with agitation and delirium due to drug use.  Drug screen positive for amphetamines, marijuana, and benzodiazepines.  Creatinine and CPK have risen considerably since admission.        Interval History:  He is awake and alert.  He denies shortness of breath.  He has mild muscle soreness.    Review of patient's allergies indicates:  No Known Allergies  Current Facility-Administered Medications   Medication Frequency    HYDROcodone-acetaminophen 7.5-325 mg per tablet 1 tablet Q4H PRN    LORazepam injection 2 mg QID PRN    mupirocin 2 % ointment BID    promethazine injection 25 mg Q4H PRN    sodium bicarbonate 100 mEq in dextrose 5 % and 0.2 % NaCl 1,000 mL infusion Continuous    sodium chloride 0.9% flush 10 mL PRN       Objective:     Vital Signs (Most Recent):  Temp: 98.5 °F (36.9 °C) (05/03/22 1100)  Pulse: 62 (05/03/22 1000)  Resp: 17 (05/03/22 1000)  BP: (!) 147/96 (05/03/22 1000)  SpO2: 97 % (05/03/22 1000)   Vital Signs (24h Range):  Temp:  [97.8 °F (36.6 °C)-99.3 °F (37.4 °C)] 98.5 °F (36.9 °C)  Pulse:  [62-85] 62  Resp:  [13-26] 17  SpO2:  [94 %-99 %] 97 %  BP: (123-149)/() 147/96     Weight: 96.8 kg (213 lb 6.5 oz) (05/03/22 0525)  Body mass index is 32.45 kg/m².  Body surface area is 2.16 meters squared.    I/O last 3 completed shifts:  In: 4750.6 [I.V.:4750.6]  Out: 1570 [Urine:1570]    Physical Exam  Constitutional:       General: He is not in acute distress.  HENT:      Head: Normocephalic and atraumatic.   Eyes:      Pupils: Pupils are equal, round, and reactive to light.   Cardiovascular:      Rate and Rhythm: Normal rate and regular rhythm.       Heart sounds: No murmur heard.    No friction rub. No gallop.   Pulmonary:      Effort: No respiratory distress.      Breath sounds: Normal breath sounds.   Abdominal:      General: Bowel sounds are normal.      Tenderness: There is no abdominal tenderness. There is no guarding.   Musculoskeletal:      Right lower leg: No edema.      Left lower leg: No edema.   Neurological:      Mental Status: He is alert and oriented to person, place, and time.       Significant Labs:  BMP:   Recent Labs   Lab 04/30/22  1503 05/01/22  0323 05/03/22  0347   *   < > 87      < > 137   K 4.1   < > 3.7      < > 105   CO2 14*   < > 23   BUN 7   < > 19*   CREATININE 2.08*   < > 7.79*   CALCIUM 9.8   < > 7.6*   MG 3.2*  --   --     < > = values in this interval not displayed.     Cardiac Markers: No results for input(s): CKMB, TROPONINT, MYOGLOBIN in the last 168 hours.  CBC:   Recent Labs   Lab 04/30/22  1503   WBC 10.64   RBC 5.29   HGB 16.0   HCT 45.8      MCV 86.6   MCH 30.2   MCHC 34.9        Significant Imaging:      Assessment/Plan:     * Amphetamine delirium  Resolved    Rhabdomyolysis  CPK has improved considerably    SARA (acute kidney injury)  Creatinine has risen significantly from 1.3-5.99.  CPK has risen to 46,000.  This is consistent with rhabdomyolysis.  Creatinine is significantly higher.  However, urine output is improving.  CPK is falling.  Continue alkaline IV fluid        Thank you for your consult.     Jewel Batista MD  Nephrology  [facility] [place]

## 2022-05-03 NOTE — CONSULTS
[facility] ICU  Nephrology  Consult Note    Patient Name: Jorge Riggins  MRN: [id number]  Admission Date: 4/30/2022  Hospital Length of Stay: 2 days  Attending Provider: Curry Lott DO   Primary Care Physician: Primary Doctor No  Principal Problem:Amphetamine delirium    Consults  Subjective:     HPI: 26-year-old man admitted yesterday with agitation and delirium due to drug use.  Drug screen positive for amphetamines, marijuana, and benzodiazepines.  Creatinine and CPK have risen considerably since admission.        No past medical history on file.    No past surgical history on file.    Review of patient's allergies indicates:  No Known Allergies  Current Facility-Administered Medications   Medication Frequency    0.45% NaCl infusion Continuous    HYDROcodone-acetaminophen 7.5-325 mg per tablet 1 tablet Q4H PRN    LORazepam injection 2 mg QID PRN    mupirocin 2 % ointment BID    promethazine injection 25 mg Q4H PRN    sodium chloride 0.9% flush 10 mL PRN     Family History    None       Tobacco Use    Smoking status: Current Every Day Smoker    Smokeless tobacco: Not on file   Substance and Sexual Activity    Alcohol use: Yes    Drug use: Not Currently    Sexual activity: Not on file     Review of Systems   Constitutional:  Negative for fever.   HENT: Negative.     Respiratory:  Negative for shortness of breath.    Cardiovascular:  Negative for chest pain.   Gastrointestinal:  Positive for nausea and vomiting. Negative for abdominal pain.   Genitourinary:  Negative for difficulty urinating, dysuria and hematuria.   Musculoskeletal:  Positive for back pain.   Psychiatric/Behavioral:  Positive for agitation and behavioral problems.    Objective:     Vital Signs (Most Recent):  Temp: 98.4 °F (36.9 °C) (05/02/22 1930)  Pulse: 84 (05/02/22 1900)  Resp: (!) 26 (05/02/22 1900)  BP: (!) 149/100 (05/02/22 1900)  SpO2: 98 % (05/02/22 1900)   Vital Signs (24h Range):  Temp:  [97.6 °F (36.4  °C)-98.6 °F (37 °C)] 98.4 °F (36.9 °C)  Pulse:  [68-89] 84  Resp:  [12-26] 26  SpO2:  [96 %-100 %] 98 %  BP: (120-149)/() 149/100     Weight: 92.8 kg (204 lb 9.4 oz) (05/02/22 1300)  Body mass index is 31.11 kg/m².  Body surface area is 2.11 meters squared.    I/O last 3 completed shifts:  In: 4731.8 [P.O.:360; I.V.:4371.8]  Out: 420 [Urine:420]    Physical Exam  Constitutional:       General: He is not in acute distress.  HENT:      Head: Normocephalic and atraumatic.   Eyes:      Pupils: Pupils are equal, round, and reactive to light.   Cardiovascular:      Rate and Rhythm: Normal rate and regular rhythm.      Heart sounds: No murmur heard.    No friction rub. No gallop.   Pulmonary:      Effort: No respiratory distress.      Breath sounds: Normal breath sounds.   Abdominal:      General: Bowel sounds are normal.      Tenderness: There is no abdominal tenderness. There is no guarding.   Musculoskeletal:      Right lower leg: No edema.      Left lower leg: No edema.   Neurological:      Mental Status: He is alert and oriented to person, place, and time.       Significant Labs:  BMP:   Recent Labs   Lab 04/30/22  1503 05/01/22  0323 05/02/22  0248   *   < > 78      < > 135*   K 4.1   < > 3.7      < > 106   CO2 14*   < > 17*   BUN 7   < > 16   CREATININE 2.08*   < > 5.99*   CALCIUM 9.8   < > 7.7*   MG 3.2*  --   --     < > = values in this interval not displayed.     Cardiac Markers: No results for input(s): CKMB, TROPONINT, MYOGLOBIN in the last 168 hours.  CBC:   Recent Labs   Lab 04/30/22  1503   WBC 10.64   RBC 5.29   HGB 16.0   HCT 45.8      MCV 86.6   MCH 30.2   MCHC 34.9       Significant Imaging:      Assessment/Plan:     * Amphetamine delirium  Treatment per primary service    SARA (acute kidney injury)  Creatinine has risen significantly from 1.3-5.99.  CPK has risen to 46,000.  This is consistent with rhabdomyolysis.  Bicarbonate will be added to his IV fluid to alkalinize  urine.  Ibuprofen will be discontinued.  Monitor urine output, CPK and renal function tests.        Thank you for your consult.     Jewel Batista MD  Nephrology  [facility]

## 2022-05-03 NOTE — ASSESSMENT & PLAN NOTE
Creatinine has risen significantly from 1.3-5.99.  CPK has risen to 46,000.  This is consistent with rhabdomyolysis.  Bicarbonate will be added to his IV fluid to alkalinize urine.  Ibuprofen will be discontinued.  Monitor urine output, CPK and renal function tests.

## 2022-05-03 NOTE — SUBJECTIVE & OBJECTIVE
No past medical history on file.    No past surgical history on file.    Review of patient's allergies indicates:  No Known Allergies  Current Facility-Administered Medications   Medication Frequency    0.45% NaCl infusion Continuous    HYDROcodone-acetaminophen 7.5-325 mg per tablet 1 tablet Q4H PRN    LORazepam injection 2 mg QID PRN    mupirocin 2 % ointment BID    promethazine injection 25 mg Q4H PRN    sodium chloride 0.9% flush 10 mL PRN     Family History    None       Tobacco Use    Smoking status: Current Every Day Smoker    Smokeless tobacco: Not on file   Substance and Sexual Activity    Alcohol use: Yes    Drug use: Not Currently    Sexual activity: Not on file     Review of Systems   Constitutional:  Negative for fever.   HENT: Negative.     Respiratory:  Negative for shortness of breath.    Cardiovascular:  Negative for chest pain.   Gastrointestinal:  Positive for nausea and vomiting. Negative for abdominal pain.   Genitourinary:  Negative for difficulty urinating, dysuria and hematuria.   Musculoskeletal:  Positive for back pain.   Psychiatric/Behavioral:  Positive for agitation and behavioral problems.    Objective:     Vital Signs (Most Recent):  Temp: 98.4 °F (36.9 °C) (05/02/22 1930)  Pulse: 84 (05/02/22 1900)  Resp: (!) 26 (05/02/22 1900)  BP: (!) 149/100 (05/02/22 1900)  SpO2: 98 % (05/02/22 1900)   Vital Signs (24h Range):  Temp:  [97.6 °F (36.4 °C)-98.6 °F (37 °C)] 98.4 °F (36.9 °C)  Pulse:  [68-89] 84  Resp:  [12-26] 26  SpO2:  [96 %-100 %] 98 %  BP: (120-149)/() 149/100     Weight: 92.8 kg (204 lb 9.4 oz) (05/02/22 1300)  Body mass index is 31.11 kg/m².  Body surface area is 2.11 meters squared.    I/O last 3 completed shifts:  In: 4731.8 [P.O.:360; I.V.:4371.8]  Out: 420 [Urine:420]    Physical Exam  Constitutional:       General: He is not in acute distress.  HENT:      Head: Normocephalic and atraumatic.   Eyes:      Pupils: Pupils are equal, round, and reactive to light.    Cardiovascular:      Rate and Rhythm: Normal rate and regular rhythm.      Heart sounds: No murmur heard.    No friction rub. No gallop.   Pulmonary:      Effort: No respiratory distress.      Breath sounds: Normal breath sounds.   Abdominal:      General: Bowel sounds are normal.      Tenderness: There is no abdominal tenderness. There is no guarding.   Musculoskeletal:      Right lower leg: No edema.      Left lower leg: No edema.   Neurological:      Mental Status: He is alert and oriented to person, place, and time.       Significant Labs:  BMP:   Recent Labs   Lab 04/30/22  1503 05/01/22  0323 05/02/22  0248   *   < > 78      < > 135*   K 4.1   < > 3.7      < > 106   CO2 14*   < > 17*   BUN 7   < > 16   CREATININE 2.08*   < > 5.99*   CALCIUM 9.8   < > 7.7*   MG 3.2*  --   --     < > = values in this interval not displayed.     Cardiac Markers: No results for input(s): CKMB, TROPONINT, MYOGLOBIN in the last 168 hours.  CBC:   Recent Labs   Lab 04/30/22  1503   WBC 10.64   RBC 5.29   HGB 16.0   HCT 45.8      MCV 86.6   MCH 30.2   MCHC 34.9       Significant Imaging:

## 2022-05-04 LAB
ANION GAP SERPL CALCULATED.3IONS-SCNC: 13 MMOL/L (ref 7–16)
BUN SERPL-MCNC: 18 MG/DL (ref 7–18)
BUN/CREAT SERPL: 3 (ref 6–20)
CALCIUM SERPL-MCNC: 7.7 MG/DL (ref 8.5–10.1)
CHLORIDE SERPL-SCNC: 101 MMOL/L (ref 98–107)
CK SERPL-CCNC: ABNORMAL U/L (ref 39–308)
CK SERPL-CCNC: ABNORMAL U/L (ref 39–308)
CO2 SERPL-SCNC: 30 MMOL/L (ref 21–32)
CREAT SERPL-MCNC: 6.2 MG/DL (ref 0.7–1.3)
GLUCOSE SERPL-MCNC: 90 MG/DL (ref 74–106)
POTASSIUM SERPL-SCNC: 3.4 MMOL/L (ref 3.5–5.1)
SODIUM SERPL-SCNC: 141 MMOL/L (ref 136–145)

## 2022-05-04 PROCEDURE — 99233 PR SUBSEQUENT HOSPITAL CARE,LEVL III: ICD-10-PCS | Mod: ,,, | Performed by: HOSPITALIST

## 2022-05-04 PROCEDURE — 25000003 PHARM REV CODE 250: Performed by: HOSPITALIST

## 2022-05-04 PROCEDURE — 25000003 PHARM REV CODE 250: Performed by: INTERNAL MEDICINE

## 2022-05-04 PROCEDURE — 11000001 HC ACUTE MED/SURG PRIVATE ROOM

## 2022-05-04 PROCEDURE — 82550 ASSAY OF CK (CPK): CPT | Performed by: INTERNAL MEDICINE

## 2022-05-04 PROCEDURE — 99233 SBSQ HOSP IP/OBS HIGH 50: CPT | Mod: ,,, | Performed by: HOSPITALIST

## 2022-05-04 PROCEDURE — 36415 COLL VENOUS BLD VENIPUNCTURE: CPT | Performed by: INTERNAL MEDICINE

## 2022-05-04 PROCEDURE — 63600175 PHARM REV CODE 636 W HCPCS: Performed by: INTERNAL MEDICINE

## 2022-05-04 PROCEDURE — 80048 BASIC METABOLIC PNL TOTAL CA: CPT | Performed by: INTERNAL MEDICINE

## 2022-05-04 RX ADMIN — METOCLOPRAMIDE 5 MG: 5 INJECTION, SOLUTION INTRAMUSCULAR; INTRAVENOUS at 09:05

## 2022-05-04 RX ADMIN — HYDROCODONE BITARTRATE AND ACETAMINOPHEN 1 TABLET: 7.5; 325 TABLET ORAL at 08:05

## 2022-05-04 RX ADMIN — ACETAMINOPHEN 1000 MG: 500 TABLET ORAL at 01:05

## 2022-05-04 RX ADMIN — SODIUM BICARBONATE: 84 INJECTION, SOLUTION INTRAVENOUS at 04:05

## 2022-05-04 RX ADMIN — METOCLOPRAMIDE 5 MG: 5 INJECTION, SOLUTION INTRAMUSCULAR; INTRAVENOUS at 04:05

## 2022-05-04 RX ADMIN — METOCLOPRAMIDE 5 MG: 5 INJECTION, SOLUTION INTRAMUSCULAR; INTRAVENOUS at 05:05

## 2022-05-04 RX ADMIN — MUPIROCIN: 20 OINTMENT TOPICAL at 08:05

## 2022-05-04 RX ADMIN — SODIUM BICARBONATE: 84 INJECTION, SOLUTION INTRAVENOUS at 01:05

## 2022-05-04 NOTE — HOSPITAL COURSE
05/04/2022:  Patient's mental status appears relatively normal however he is unable to eat.  He is reporting some nausea and has requested Reglan.    Given persistent renal failure, patient may require a few more days for IV fluids.  Will defer to Nephrology.    5/5:  Patient not eating.  This may related to uremia.  Renal function is improving.  Anticipate discharge soon.    5/6: f/u with Nephrology, Dr. Batista, in 2 weeks with labs prior.

## 2022-05-04 NOTE — SUBJECTIVE & OBJECTIVE
Interval History:  He denies SOB.  No GI symptoms.    Review of patient's allergies indicates:  No Known Allergies  Current Facility-Administered Medications   Medication Frequency    acetaminophen tablet 1,000 mg Q6H PRN    bisacodyL EC tablet 10 mg Daily PRN    dextromethorphan-guaiFENesin  mg/5 ml liquid 10 mL Q6H PRN    HYDROcodone-acetaminophen 7.5-325 mg per tablet 1 tablet Q4H PRN    LORazepam injection 2 mg QID PRN    metoclopramide HCl injection 5 mg QID (AC & HS)    mupirocin 2 % ointment BID    ondansetron injection 8 mg Q6H PRN    promethazine injection 25 mg Q4H PRN    simethicone chewable tablet 80 mg TID PRN    sodium bicarbonate 100 mEq in dextrose 5 % and 0.2 % NaCl 1,000 mL infusion Continuous    sodium chloride 0.9% flush 10 mL PRN    traZODone tablet 50 mg Nightly PRN       Objective:     Vital Signs (Most Recent):  Temp: 97.5 °F (36.4 °C) (05/04/22 1030)  Pulse: 68 (05/04/22 1030)  Resp: 18 (05/04/22 1030)  BP: 124/68 (05/04/22 1030)  SpO2: 97 % (05/04/22 1030)  O2 Device (Oxygen Therapy): room air (05/03/22 1905) Vital Signs (24h Range):  Temp:  [97.5 °F (36.4 °C)-99 °F (37.2 °C)] 97.5 °F (36.4 °C)  Pulse:  [] 68  Resp:  [16-21] 18  SpO2:  [95 %-99 %] 97 %  BP: (121-161)/() 124/68     Weight: 96.8 kg (213 lb 6.5 oz) (05/03/22 0525)  Body mass index is 32.45 kg/m².  Body surface area is 2.16 meters squared.    I/O last 3 completed shifts:  In: 3871.7 [P.O.:1000; I.V.:2871.7]  Out: 5350 [Urine:5350]    Physical Exam  Constitutional:       General: He is not in acute distress.  HENT:      Head: Normocephalic and atraumatic.   Eyes:      Pupils: Pupils are equal, round, and reactive to light.   Cardiovascular:      Rate and Rhythm: Normal rate and regular rhythm.      Heart sounds: No murmur heard.    No friction rub. No gallop.   Pulmonary:      Effort: No respiratory distress.      Breath sounds: Normal breath sounds.   Abdominal:      General: Bowel sounds are normal.       Tenderness: There is no abdominal tenderness. There is no guarding.   Musculoskeletal:      Right lower leg: No edema.      Left lower leg: No edema.   Neurological:      Mental Status: He is alert and oriented to person, place, and time.       Significant Labs:  BMP:   Recent Labs   Lab 04/30/22  1503 05/01/22  0323 05/04/22  0537   *   < > 90      < > 141   K 4.1   < > 3.4*      < > 101   CO2 14*   < > 30   BUN 7   < > 18   CREATININE 2.08*   < > 6.20*   CALCIUM 9.8   < > 7.7*   MG 3.2*  --   --     < > = values in this interval not displayed.     Cardiac Markers: No results for input(s): CKMB, TROPONINT, MYOGLOBIN in the last 168 hours.     Significant Imaging:

## 2022-05-04 NOTE — PROGRESS NOTES
[facility] Medicine  Progress Note    Patient Name: Jorge Riggins  MRN: [id number]  Patient Class: IP- Inpatient   Admission Date: 4/30/2022  Length of Stay: 4 days  Attending Physician: Sandra Goodman MD  Primary Care Provider: Primary Doctor No        Subjective:     Principal Problem:Amphetamine delirium    HPI:  Patient admits to using marijuana and ecstasy over the last couple of days says he had done 2 days his drug screen is present present for benzodiazepines and amphetamines and marijuana.  Today he was running around the apartment complex naked his mother was called when she got there he had some pants on they tried to severe doing min had to use a [place]  [employer] on his arm to get him to remove from the premises.  He is search since then been sedated with Ativan is much more awake alert denies any homicidal or suicidal ideations      Overview/Hospital Course:  05/04/2022:  Patient's mental status appears relatively normal however he is unable to eat.  He is reporting some nausea and has requested Reglan.    Given persistent renal failure, patient may require a few more days for IV fluids.  Will defer to Nephrology.      Interval History:     Review of Systems   Constitutional:  Negative for fever.   HENT: Negative.     Respiratory:  Negative for shortness of breath.    Cardiovascular:  Negative for chest pain.   Gastrointestinal:  Positive for nausea. Negative for abdominal pain and vomiting.   Genitourinary:  Negative for difficulty urinating, dysuria and hematuria.   Musculoskeletal:  Negative for back pain.   Psychiatric/Behavioral:  Negative for agitation and behavioral problems.    Objective:     Vital Signs (Most Recent):  Temp: 98 °F (36.7 °C) (05/04/22 1500)  Pulse: 72 (05/04/22 1500)  Resp: 18 (05/04/22 1500)  BP: 124/68 (05/04/22 1500)  SpO2: 99 % (05/04/22 1500) Vital Signs (24h Range):  Temp:  [97.5 °F (36.4 °C)-98.6 °F (37 °C)] 98 °F (36.7 °C)  Pulse:  []  72  Resp:  [16-18] 18  SpO2:  [97 %-99 %] 99 %  BP: (121-146)/(63-96) 124/68     Weight: 96.8 kg (213 lb 6.5 oz)  Body mass index is 32.45 kg/m².    Intake/Output Summary (Last 24 hours) at 5/4/2022 1839  Last data filed at 5/4/2022 0400  Gross per 24 hour   Intake --   Output 2600 ml   Net -2600 ml      Physical Exam  Constitutional:       General: He is not in acute distress.  HENT:      Head: Normocephalic and atraumatic.   Eyes:      Pupils: Pupils are equal, round, and reactive to light.   Cardiovascular:      Rate and Rhythm: Normal rate and regular rhythm.      Heart sounds: No murmur heard.    No friction rub. No gallop.   Pulmonary:      Effort: No respiratory distress.      Breath sounds: Normal breath sounds.   Abdominal:      General: Bowel sounds are normal.      Tenderness: There is no abdominal tenderness. There is no guarding.   Musculoskeletal:      Right lower leg: No edema.      Left lower leg: No edema.   Neurological:      Mental Status: He is alert and oriented to person, place, and time.       Significant Labs: All pertinent labs within the past 24 hours have been reviewed.    Significant Imaging: I have reviewed all pertinent imaging results/findings within the past 24 hours.      Assessment/Plan:      * Amphetamine delirium  Polysubstance use.  Mental status is now near baseline.      Rhabdomyolysis  CK elevated to 46,000.  Likely contributes to SARA.      Sinus tachycardia  Likely related to polysubstance use.      Fever  Initial fevers that are now resolved.   A related to amphetamines or other etiology.  No antibiotics currently but will continue to monitor.      SARA (acute kidney injury)  Patient with acute kidney injury likely d/t in setting of rhabomyolysis Which is currently worsening. Labs reviewed- Renal function/electrolytes with Estimated Creatinine Clearance: 20 mL/min (A) (based on SCr of 6.2 mg/dL (H)). according to latest data. Monitor urine output and serial BMP and adjust  therapy as needed. Avoid nephrotoxins and renally dose meds for GFR listed above.     Appreciate evaluation management by Nephrology.        VTE Risk Mitigation (From admission, onward)         Ordered     IP VTE LOW RISK PATIENT  Once         04/30/22 1704     Place sequential compression device  Until discontinued         04/30/22 1704                Discharge Planning   LINDSEY:      Code Status: Full Code   Is the patient medically ready for discharge?:     Reason for patient still in hospital (select all that apply): Treatment  Discharge Plan A: Home                  Sandra Goodman MD  Department of Hospital Medicine   [facility] - Orthopedic

## 2022-05-04 NOTE — PROGRESS NOTES
[facility] - [facility]  Nephrology  Progress Note    Patient Name: Jorge Riggins  MRN: [id number]  Admission Date: 4/30/2022  Hospital Length of Stay: 4 days  Attending Provider: Sandra Goodman MD   Primary Care Physician: Primary Doctor No  Principal Problem:Amphetamine delirium    Subjective:     HPI: 26-year-old man admitted yesterday with agitation and delirium due to drug use.  Drug screen positive for amphetamines, marijuana, and benzodiazepines.  Creatinine and CPK have risen considerably since admission.        Interval History:  He denies SOB.  No GI symptoms.    Review of patient's allergies indicates:  No Known Allergies  Current Facility-Administered Medications   Medication Frequency    acetaminophen tablet 1,000 mg Q6H PRN    bisacodyL EC tablet 10 mg Daily PRN    dextromethorphan-guaiFENesin  mg/5 ml liquid 10 mL Q6H PRN    HYDROcodone-acetaminophen 7.5-325 mg per tablet 1 tablet Q4H PRN    LORazepam injection 2 mg QID PRN    metoclopramide HCl injection 5 mg QID (AC & HS)    mupirocin 2 % ointment BID    ondansetron injection 8 mg Q6H PRN    promethazine injection 25 mg Q4H PRN    simethicone chewable tablet 80 mg TID PRN    sodium bicarbonate 100 mEq in dextrose 5 % and 0.2 % NaCl 1,000 mL infusion Continuous    sodium chloride 0.9% flush 10 mL PRN    traZODone tablet 50 mg Nightly PRN       Objective:     Vital Signs (Most Recent):  Temp: 97.5 °F (36.4 °C) (05/04/22 1030)  Pulse: 68 (05/04/22 1030)  Resp: 18 (05/04/22 1030)  BP: 124/68 (05/04/22 1030)  SpO2: 97 % (05/04/22 1030)  O2 Device (Oxygen Therapy): room air (05/03/22 1905) Vital Signs (24h Range):  Temp:  [97.5 °F (36.4 °C)-99 °F (37.2 °C)] 97.5 °F (36.4 °C)  Pulse:  [] 68  Resp:  [16-21] 18  SpO2:  [95 %-99 %] 97 %  BP: (121-161)/() 124/68     Weight: 96.8 kg (213 lb 6.5 oz) (05/03/22 3142)  Body mass index is 32.45 kg/m².  Body surface area is 2.16 meters squared.    I/O last 3 completed  shifts:  In: 3871.7 [P.O.:1000; I.V.:2871.7]  Out: 5350 [Urine:5350]    Physical Exam  Constitutional:       General: He is not in acute distress.  HENT:      Head: Normocephalic and atraumatic.   Eyes:      Pupils: Pupils are equal, round, and reactive to light.   Cardiovascular:      Rate and Rhythm: Normal rate and regular rhythm.      Heart sounds: No murmur heard.    No friction rub. No gallop.   Pulmonary:      Effort: No respiratory distress.      Breath sounds: Normal breath sounds.   Abdominal:      General: Bowel sounds are normal.      Tenderness: There is no abdominal tenderness. There is no guarding.   Musculoskeletal:      Right lower leg: No edema.      Left lower leg: No edema.   Neurological:      Mental Status: He is alert and oriented to person, place, and time.       Significant Labs:  BMP:   Recent Labs   Lab 04/30/22  1503 05/01/22  0323 05/04/22  0537   *   < > 90      < > 141   K 4.1   < > 3.4*      < > 101   CO2 14*   < > 30   BUN 7   < > 18   CREATININE 2.08*   < > 6.20*   CALCIUM 9.8   < > 7.7*   MG 3.2*  --   --     < > = values in this interval not displayed.     Cardiac Markers: No results for input(s): CKMB, TROPONINT, MYOGLOBIN in the last 168 hours.     Significant Imaging:      Assessment/Plan:     * Amphetamine delirium  Resolved    Rhabdomyolysis  CPK improving    SARA (acute kidney injury)  Creatinine has risen significantly from 1.3-5.99.  CPK has risen to 46,000.  This is consistent with rhabdomyolysis.  Creatinine is beginning to fall.  Urine output has improved.  Continue IV fluid        Thank you for your consult.     Jewel Batista MD  Nephrology  [facility] - Orthopedic

## 2022-05-04 NOTE — ASSESSMENT & PLAN NOTE
Creatinine has risen significantly from 1.3-5.99.  CPK has risen to 46,000.  This is consistent with rhabdomyolysis.  Creatinine is beginning to fall.  Urine output has improved.  Continue IV fluid

## 2022-05-04 NOTE — NURSING
Mom of the Pt is wanting him to get admitted into a Psych[facility] Hospital instead of DC home. I will mention to night shift to pass along to day shift. Pt is still not in his right mind per mom and she is concerned if he goes back home. She works and can not sit up here with him all day.

## 2022-05-04 NOTE — ASSESSMENT & PLAN NOTE
Initial fevers that are now resolved.   A related to amphetamines or other etiology.  No antibiotics currently but will continue to monitor.

## 2022-05-04 NOTE — SUBJECTIVE & OBJECTIVE
Interval History:     Review of Systems   Constitutional:  Negative for fever.   HENT: Negative.     Respiratory:  Negative for shortness of breath.    Cardiovascular:  Negative for chest pain.   Gastrointestinal:  Positive for nausea. Negative for abdominal pain and vomiting.   Genitourinary:  Negative for difficulty urinating, dysuria and hematuria.   Musculoskeletal:  Negative for back pain.   Psychiatric/Behavioral:  Negative for agitation and behavioral problems.    Objective:     Vital Signs (Most Recent):  Temp: 98 °F (36.7 °C) (05/04/22 1500)  Pulse: 72 (05/04/22 1500)  Resp: 18 (05/04/22 1500)  BP: 124/68 (05/04/22 1500)  SpO2: 99 % (05/04/22 1500) Vital Signs (24h Range):  Temp:  [97.5 °F (36.4 °C)-98.6 °F (37 °C)] 98 °F (36.7 °C)  Pulse:  [] 72  Resp:  [16-18] 18  SpO2:  [97 %-99 %] 99 %  BP: (121-146)/(63-96) 124/68     Weight: 96.8 kg (213 lb 6.5 oz)  Body mass index is 32.45 kg/m².    Intake/Output Summary (Last 24 hours) at 5/4/2022 1839  Last data filed at 5/4/2022 0400  Gross per 24 hour   Intake --   Output 2600 ml   Net -2600 ml      Physical Exam  Constitutional:       General: He is not in acute distress.  HENT:      Head: Normocephalic and atraumatic.   Eyes:      Pupils: Pupils are equal, round, and reactive to light.   Cardiovascular:      Rate and Rhythm: Normal rate and regular rhythm.      Heart sounds: No murmur heard.    No friction rub. No gallop.   Pulmonary:      Effort: No respiratory distress.      Breath sounds: Normal breath sounds.   Abdominal:      General: Bowel sounds are normal.      Tenderness: There is no abdominal tenderness. There is no guarding.   Musculoskeletal:      Right lower leg: No edema.      Left lower leg: No edema.   Neurological:      Mental Status: He is alert and oriented to person, place, and time.       Significant Labs: All pertinent labs within the past 24 hours have been reviewed.    Significant Imaging: I have reviewed all pertinent imaging  results/findings within the past 24 hours.

## 2022-05-04 NOTE — ASSESSMENT & PLAN NOTE
Patient with acute kidney injury likely d/t in setting of rhabomyolysis Which is currently worsening. Labs reviewed- Renal function/electrolytes with Estimated Creatinine Clearance: 20 mL/min (A) (based on SCr of 6.2 mg/dL (H)). according to latest data. Monitor urine output and serial BMP and adjust therapy as needed. Avoid nephrotoxins and renally dose meds for GFR listed above.     Appreciate evaluation management by Nephrology.

## 2022-05-05 LAB
ANION GAP SERPL CALCULATED.3IONS-SCNC: 15 MMOL/L (ref 7–16)
BUN SERPL-MCNC: 18 MG/DL (ref 7–18)
BUN/CREAT SERPL: 5 (ref 6–20)
CALCIUM SERPL-MCNC: 8.1 MG/DL (ref 8.5–10.1)
CHLORIDE SERPL-SCNC: 99 MMOL/L (ref 98–107)
CK SERPL-CCNC: ABNORMAL U/L (ref 39–308)
CO2 SERPL-SCNC: 31 MMOL/L (ref 21–32)
CREAT SERPL-MCNC: 3.64 MG/DL (ref 0.7–1.3)
GLUCOSE SERPL-MCNC: 83 MG/DL (ref 74–106)
POTASSIUM SERPL-SCNC: 3.6 MMOL/L (ref 3.5–5.1)
SODIUM SERPL-SCNC: 141 MMOL/L (ref 136–145)

## 2022-05-05 PROCEDURE — 36415 COLL VENOUS BLD VENIPUNCTURE: CPT | Performed by: INTERNAL MEDICINE

## 2022-05-05 PROCEDURE — 25000003 PHARM REV CODE 250: Performed by: INTERNAL MEDICINE

## 2022-05-05 PROCEDURE — 36415 COLL VENOUS BLD VENIPUNCTURE: CPT | Performed by: HOSPITALIST

## 2022-05-05 PROCEDURE — 99233 SBSQ HOSP IP/OBS HIGH 50: CPT | Mod: ,,, | Performed by: HOSPITALIST

## 2022-05-05 PROCEDURE — 80048 BASIC METABOLIC PNL TOTAL CA: CPT | Performed by: INTERNAL MEDICINE

## 2022-05-05 PROCEDURE — 99233 PR SUBSEQUENT HOSPITAL CARE,LEVL III: ICD-10-PCS | Mod: ,,, | Performed by: HOSPITALIST

## 2022-05-05 PROCEDURE — 82550 ASSAY OF CK (CPK): CPT | Performed by: INTERNAL MEDICINE

## 2022-05-05 PROCEDURE — 25000003 PHARM REV CODE 250: Performed by: HOSPITALIST

## 2022-05-05 PROCEDURE — 11000001 HC ACUTE MED/SURG PRIVATE ROOM

## 2022-05-05 PROCEDURE — 63600175 PHARM REV CODE 636 W HCPCS: Performed by: INTERNAL MEDICINE

## 2022-05-05 RX ADMIN — METOCLOPRAMIDE 5 MG: 5 INJECTION, SOLUTION INTRAMUSCULAR; INTRAVENOUS at 06:05

## 2022-05-05 RX ADMIN — TRAZODONE HYDROCHLORIDE 50 MG: 50 TABLET ORAL at 08:05

## 2022-05-05 RX ADMIN — MUPIROCIN: 20 OINTMENT TOPICAL at 08:05

## 2022-05-05 RX ADMIN — HYDROCODONE BITARTRATE AND ACETAMINOPHEN 1 TABLET: 7.5; 325 TABLET ORAL at 06:05

## 2022-05-05 RX ADMIN — METOCLOPRAMIDE 5 MG: 5 INJECTION, SOLUTION INTRAMUSCULAR; INTRAVENOUS at 03:05

## 2022-05-05 RX ADMIN — SODIUM BICARBONATE: 84 INJECTION, SOLUTION INTRAVENOUS at 03:05

## 2022-05-05 RX ADMIN — SODIUM BICARBONATE: 84 INJECTION, SOLUTION INTRAVENOUS at 09:05

## 2022-05-05 RX ADMIN — METOCLOPRAMIDE 5 MG: 5 INJECTION, SOLUTION INTRAMUSCULAR; INTRAVENOUS at 11:05

## 2022-05-05 RX ADMIN — HYDROCODONE BITARTRATE AND ACETAMINOPHEN 1 TABLET: 7.5; 325 TABLET ORAL at 09:05

## 2022-05-05 RX ADMIN — METOCLOPRAMIDE 5 MG: 5 INJECTION, SOLUTION INTRAMUSCULAR; INTRAVENOUS at 09:05

## 2022-05-05 RX ADMIN — MUPIROCIN: 20 OINTMENT TOPICAL at 09:05

## 2022-05-05 NOTE — SUBJECTIVE & OBJECTIVE
Interval History:  No SOB.  Nausea earlier today.    Review of patient's allergies indicates:  No Known Allergies  Current Facility-Administered Medications   Medication Frequency    acetaminophen tablet 1,000 mg Q6H PRN    bisacodyL EC tablet 10 mg Daily PRN    dextromethorphan-guaiFENesin  mg/5 ml liquid 10 mL Q6H PRN    HYDROcodone-acetaminophen 7.5-325 mg per tablet 1 tablet Q4H PRN    LORazepam injection 2 mg QID PRN    metoclopramide HCl injection 5 mg QID (AC & HS)    mupirocin 2 % ointment BID    ondansetron injection 8 mg Q6H PRN    promethazine injection 25 mg Q4H PRN    simethicone chewable tablet 80 mg TID PRN    sodium bicarbonate 100 mEq in dextrose 5 % and 0.2 % NaCl 1,000 mL infusion Continuous    sodium chloride 0.9% flush 10 mL PRN    traZODone tablet 50 mg Nightly PRN       Objective:     Vital Signs (Most Recent):  Temp: 98.5 °F (36.9 °C) (05/05/22 1045)  Pulse: 101 (05/05/22 1045)  Resp: 18 (05/05/22 1045)  BP: 131/87 (05/05/22 1045)  SpO2: 96 % (05/05/22 1045)  O2 Device (Oxygen Therapy): room air (05/03/22 1905) Vital Signs (24h Range):  Temp:  [98 °F (36.7 °C)-98.6 °F (37 °C)] 98.5 °F (36.9 °C)  Pulse:  [] 101  Resp:  [17-18] 18  SpO2:  [94 %-98 %] 96 %  BP: (118-145)/(77-87) 131/87     Weight: 96.8 kg (213 lb 6.5 oz) (05/03/22 0525)  Body mass index is 32.45 kg/m².  Body surface area is 2.16 meters squared.    I/O last 3 completed shifts:  In: 1000 [I.V.:1000]  Out: 3100 [Urine:3100]    Physical Exam  Constitutional:       General: He is not in acute distress.  HENT:      Head: Normocephalic and atraumatic.   Eyes:      Pupils: Pupils are equal, round, and reactive to light.   Cardiovascular:      Rate and Rhythm: Normal rate and regular rhythm.      Heart sounds: No murmur heard.    No friction rub. No gallop.   Pulmonary:      Effort: No respiratory distress.      Breath sounds: Normal breath sounds.   Abdominal:      General: Bowel sounds are normal.      Tenderness: There is  no abdominal tenderness. There is no guarding.   Musculoskeletal:      Right lower leg: No edema.      Left lower leg: No edema.   Neurological:      Mental Status: He is alert and oriented to person, place, and time.       Significant Labs:  BMP:   Recent Labs   Lab 04/30/22  1503 05/01/22  0323 05/05/22  0417   *   < > 83      < > 141   K 4.1   < > 3.6      < > 99   CO2 14*   < > 31   BUN 7   < > 18   CREATININE 2.08*   < > 3.64*   CALCIUM 9.8   < > 8.1*   MG 3.2*  --   --     < > = values in this interval not displayed.        Significant Imaging:

## 2022-05-05 NOTE — ASSESSMENT & PLAN NOTE
Creatinine has risen significantly from 1.3-5.99.  CPK has risen to 46,000.  This is consistent with rhabdomyolysis.  Renal function is improving.  CPK remains elevated.  Continue IV fluid

## 2022-05-05 NOTE — PROGRESS NOTES
[facility]  Nephrology  Progress Note    Patient Name: Jorge Riggins  MRN: [id number]  Admission Date: 4/30/2022  Hospital Length of Stay: 5 days  Attending Provider: Sandra Goodman MD   Primary Care Physician: Primary Doctor No  Principal Problem:SARA (acute kidney injury)    Subjective:     HPI: 26-year-old man admitted yesterday with agitation and delirium due to drug use.  Drug screen positive for amphetamines, marijuana, and benzodiazepines.  Creatinine and CPK have risen considerably since admission.        Interval History:  No SOB.  Nausea earlier today.    Review of patient's allergies indicates:  No Known Allergies  Current Facility-Administered Medications   Medication Frequency    acetaminophen tablet 1,000 mg Q6H PRN    bisacodyL EC tablet 10 mg Daily PRN    dextromethorphan-guaiFENesin  mg/5 ml liquid 10 mL Q6H PRN    HYDROcodone-acetaminophen 7.5-325 mg per tablet 1 tablet Q4H PRN    LORazepam injection 2 mg QID PRN    metoclopramide HCl injection 5 mg QID (AC & HS)    mupirocin 2 % ointment BID    ondansetron injection 8 mg Q6H PRN    promethazine injection 25 mg Q4H PRN    simethicone chewable tablet 80 mg TID PRN    sodium bicarbonate 100 mEq in dextrose 5 % and 0.2 % NaCl 1,000 mL infusion Continuous    sodium chloride 0.9% flush 10 mL PRN    traZODone tablet 50 mg Nightly PRN       Objective:     Vital Signs (Most Recent):  Temp: 98.5 °F (36.9 °C) (05/05/22 1045)  Pulse: 101 (05/05/22 1045)  Resp: 18 (05/05/22 1045)  BP: 131/87 (05/05/22 1045)  SpO2: 96 % (05/05/22 1045)  O2 Device (Oxygen Therapy): room air (05/03/22 1905) Vital Signs (24h Range):  Temp:  [98 °F (36.7 °C)-98.6 °F (37 °C)] 98.5 °F (36.9 °C)  Pulse:  [] 101  Resp:  [17-18] 18  SpO2:  [94 %-98 %] 96 %  BP: (118-145)/(77-87) 131/87     Weight: 96.8 kg (213 lb 6.5 oz) (05/03/22 4957)  Body mass index is 32.45 kg/m².  Body surface area is 2.16 meters squared.    I/O last 3 completed  shifts:  In: 1000 [I.V.:1000]  Out: 3100 [Urine:3100]    Physical Exam  Constitutional:       General: He is not in acute distress.  HENT:      Head: Normocephalic and atraumatic.   Eyes:      Pupils: Pupils are equal, round, and reactive to light.   Cardiovascular:      Rate and Rhythm: Normal rate and regular rhythm.      Heart sounds: No murmur heard.    No friction rub. No gallop.   Pulmonary:      Effort: No respiratory distress.      Breath sounds: Normal breath sounds.   Abdominal:      General: Bowel sounds are normal.      Tenderness: There is no abdominal tenderness. There is no guarding.   Musculoskeletal:      Right lower leg: No edema.      Left lower leg: No edema.   Neurological:      Mental Status: He is alert and oriented to person, place, and time.       Significant Labs:  BMP:   Recent Labs   Lab 04/30/22  1503 05/01/22  0323 05/05/22  0417   *   < > 83      < > 141   K 4.1   < > 3.6      < > 99   CO2 14*   < > 31   BUN 7   < > 18   CREATININE 2.08*   < > 3.64*   CALCIUM 9.8   < > 8.1*   MG 3.2*  --   --     < > = values in this interval not displayed.        Significant Imaging:      Assessment/Plan:     * SARA (acute kidney injury)  Creatinine has risen significantly from 1.3-5.99.  CPK has risen to 46,000.  This is consistent with rhabdomyolysis.  Renal function is improving.  CPK remains elevated.  Continue IV fluid    Rhabdomyolysis  CPK remains above 40,000.    Amphetamine delirium  Resolved        Thank you for your consult.     Jewel Batista MD  Nephrology  [facility]

## 2022-05-05 NOTE — PLAN OF CARE
Chart reviewed, Pts mental status is now near baseline. Initial fevers are now resolved, no abx currently but will cont to monitor. SW will cont to follow.

## 2022-05-06 VITALS
RESPIRATION RATE: 18 BRPM | WEIGHT: 213.38 LBS | TEMPERATURE: 98 F | HEART RATE: 65 BPM | BODY MASS INDEX: 32.34 KG/M2 | SYSTOLIC BLOOD PRESSURE: 112 MMHG | HEIGHT: 68 IN | OXYGEN SATURATION: 96 % | DIASTOLIC BLOOD PRESSURE: 70 MMHG

## 2022-05-06 PROBLEM — F19.10 POLYSUBSTANCE ABUSE: Status: ACTIVE | Noted: 2022-05-06

## 2022-05-06 PROBLEM — F29 PSYCHOSIS: Status: ACTIVE | Noted: 2022-05-06

## 2022-05-06 LAB
ANION GAP SERPL CALCULATED.3IONS-SCNC: 15 MMOL/L (ref 7–16)
BACTERIA BLD CULT: NORMAL
BACTERIA BLD CULT: NORMAL
BUN SERPL-MCNC: 16 MG/DL (ref 7–18)
BUN/CREAT SERPL: 8 (ref 6–20)
CALCIUM SERPL-MCNC: 8.6 MG/DL (ref 8.5–10.1)
CHLORIDE SERPL-SCNC: 99 MMOL/L (ref 98–107)
CK SERPL-CCNC: ABNORMAL U/L (ref 39–308)
CO2 SERPL-SCNC: 33 MMOL/L (ref 21–32)
CREAT SERPL-MCNC: 2.12 MG/DL (ref 0.7–1.3)
GLUCOSE SERPL-MCNC: 80 MG/DL (ref 74–106)
POTASSIUM SERPL-SCNC: 3.7 MMOL/L (ref 3.5–5.1)
SODIUM SERPL-SCNC: 143 MMOL/L (ref 136–145)

## 2022-05-06 PROCEDURE — 63600175 PHARM REV CODE 636 W HCPCS: Performed by: INTERNAL MEDICINE

## 2022-05-06 PROCEDURE — 80048 BASIC METABOLIC PNL TOTAL CA: CPT | Performed by: INTERNAL MEDICINE

## 2022-05-06 PROCEDURE — 99239 PR HOSPITAL DISCHARGE DAY,>30 MIN: ICD-10-PCS | Mod: ,,, | Performed by: HOSPITALIST

## 2022-05-06 PROCEDURE — 36415 COLL VENOUS BLD VENIPUNCTURE: CPT | Performed by: INTERNAL MEDICINE

## 2022-05-06 PROCEDURE — 99239 HOSP IP/OBS DSCHRG MGMT >30: CPT | Mod: ,,, | Performed by: HOSPITALIST

## 2022-05-06 PROCEDURE — 82550 ASSAY OF CK (CPK): CPT | Performed by: INTERNAL MEDICINE

## 2022-05-06 RX ADMIN — METOCLOPRAMIDE 5 MG: 5 INJECTION, SOLUTION INTRAMUSCULAR; INTRAVENOUS at 05:05

## 2022-05-06 NOTE — ASSESSMENT & PLAN NOTE
Creatinine has risen significantly from 1.3-5.99.  CPK has risen to 46,000.  This is consistent with rhabdomyolysis.  Creatinine stable at 2. This should continue to improve.  Recommend repeat BMP in 2 weeks.

## 2022-05-06 NOTE — SUBJECTIVE & OBJECTIVE
Interval History:  No SOB.  No new symptoms    Review of patient's allergies indicates:  No Known Allergies  Current Facility-Administered Medications   Medication Frequency    acetaminophen tablet 1,000 mg Q6H PRN    bisacodyL EC tablet 10 mg Daily PRN    dextromethorphan-guaiFENesin  mg/5 ml liquid 10 mL Q6H PRN    HYDROcodone-acetaminophen 7.5-325 mg per tablet 1 tablet Q4H PRN    LORazepam injection 2 mg QID PRN    metoclopramide HCl injection 5 mg QID (AC & HS)    mupirocin 2 % ointment BID    ondansetron injection 8 mg Q6H PRN    promethazine injection 25 mg Q4H PRN    simethicone chewable tablet 80 mg TID PRN    sodium bicarbonate 100 mEq in dextrose 5 % and 0.2 % NaCl 1,000 mL infusion Continuous    sodium chloride 0.9% flush 10 mL PRN    traZODone tablet 50 mg Nightly PRN     Current Outpatient Medications   Medication    INVEGA SUSTENNA 234 mg/1.5 mL Syrg injection       Objective:     Vital Signs (Most Recent):  Temp: 98.3 °F (36.8 °C) (05/06/22 0825)  Pulse: 65 (05/06/22 0825)  Resp: 18 (05/06/22 0825)  BP: 112/70 (05/06/22 0825)  SpO2: 96 % (05/06/22 0825)  O2 Device (Oxygen Therapy): room air (05/03/22 1905) Vital Signs (24h Range):  Temp:  [97.5 °F (36.4 °C)-98.8 °F (37.1 °C)] 98.3 °F (36.8 °C)  Pulse:  [58-74] 65  Resp:  [18-20] 18  SpO2:  [96 %-98 %] 96 %  BP: (112-133)/(66-81) 112/70     Weight: 96.8 kg (213 lb 6.5 oz) (05/03/22 0525)  Body mass index is 32.45 kg/m².  Body surface area is 2.16 meters squared.    I/O last 3 completed shifts:  In: 3100 [P.O.:600; I.V.:2500]  Out: 2000 [Urine:2000]    Physical Exam  Constitutional:       General: He is not in acute distress.  HENT:      Head: Normocephalic and atraumatic.   Eyes:      Pupils: Pupils are equal, round, and reactive to light.   Cardiovascular:      Rate and Rhythm: Normal rate and regular rhythm.      Heart sounds: No murmur heard.    No friction rub. No gallop.   Pulmonary:      Effort: No respiratory distress.      Breath sounds:  Normal breath sounds.   Abdominal:      General: Bowel sounds are normal.      Tenderness: There is no abdominal tenderness. There is no guarding.   Musculoskeletal:      Right lower leg: No edema.      Left lower leg: No edema.   Neurological:      Mental Status: He is alert and oriented to person, place, and time.       Significant Labs:  BMP:   Recent Labs   Lab 04/30/22  1503 05/01/22  0323 05/06/22  0508   *   < > 80      < > 143   K 4.1   < > 3.7      < > 99   CO2 14*   < > 33*   BUN 7   < > 16   CREATININE 2.08*   < > 2.12*   CALCIUM 9.8   < > 8.6   MG 3.2*  --   --     < > = values in this interval not displayed.        Significant Imaging:

## 2022-05-06 NOTE — ASSESSMENT & PLAN NOTE
Discussed that drug use contributed to presentation and can adverse affect his health, including death.    Counseling performed and patient endorsed understanding.

## 2022-05-06 NOTE — ASSESSMENT & PLAN NOTE
On antipsychotic medication.   Possibly schizophrenia.   F/u with psychiatrist.  Will have family to  patient rather than allowing him to leave on his own.

## 2022-05-06 NOTE — SUBJECTIVE & OBJECTIVE
Interval History:     Review of Systems   Constitutional:  Negative for fever.   HENT: Negative.     Respiratory:  Negative for shortness of breath.    Cardiovascular:  Negative for chest pain.   Gastrointestinal:  Positive for nausea. Negative for abdominal pain and vomiting.   Genitourinary:  Negative for difficulty urinating, dysuria and hematuria.   Musculoskeletal:  Negative for back pain.   Psychiatric/Behavioral:  Negative for agitation and behavioral problems.    Objective:     Vital Signs (Most Recent):  Temp: 98.8 °F (37.1 °C) (05/05/22 1600)  Pulse: (!) 58 (05/05/22 1600)  Resp: 20 (05/05/22 2147)  BP: 127/81 (05/05/22 1600)  SpO2: 98 % (05/05/22 1600)   Vital Signs (24h Range):  Temp:  [98 °F (36.7 °C)-98.8 °F (37.1 °C)] 98.8 °F (37.1 °C)  Pulse:  [] 58  Resp:  [17-20] 20  SpO2:  [94 %-98 %] 98 %  BP: (118-145)/(77-87) 127/81     Weight: 96.8 kg (213 lb 6.5 oz)  Body mass index is 32.45 kg/m².    Intake/Output Summary (Last 24 hours) at 5/5/2022 2155  Last data filed at 5/5/2022 0358  Gross per 24 hour   Intake 1000 ml   Output --   Net 1000 ml      Physical Exam  Constitutional:       General: He is not in acute distress.  HENT:      Head: Normocephalic and atraumatic.   Eyes:      Pupils: Pupils are equal, round, and reactive to light.   Cardiovascular:      Rate and Rhythm: Normal rate and regular rhythm.      Heart sounds: No murmur heard.    No friction rub. No gallop.   Pulmonary:      Effort: No respiratory distress.      Breath sounds: Normal breath sounds.   Abdominal:      General: Bowel sounds are normal.      Tenderness: There is no abdominal tenderness. There is no guarding.   Musculoskeletal:      Right lower leg: No edema.      Left lower leg: No edema.   Neurological:      Mental Status: He is alert and oriented to person, place, and time.       Significant Labs: All pertinent labs within the past 24 hours have been reviewed.    Significant Imaging: I have reviewed all pertinent  imaging results/findings within the past 24 hours.

## 2022-05-06 NOTE — PROGRESS NOTES
[facility]  Nephrology  Progress Note    Patient Name: Jorge Riggins  MRN: [id number]  Admission Date: 4/30/2022  Hospital Length of Stay: 6 days  Attending Provider: No att. providers found   Primary Care Physician: Primary Doctor No  Principal Problem:SARA (acute kidney injury)    Subjective:     HPI: 26-year-old man admitted yesterday with agitation and delirium due to drug use.  Drug screen positive for amphetamines, marijuana, and benzodiazepines.  Creatinine and CPK have risen considerably since admission.        Interval History:  No SOB.  No new symptoms    Review of patient's allergies indicates:  No Known Allergies  Current Facility-Administered Medications   Medication Frequency    acetaminophen tablet 1,000 mg Q6H PRN    bisacodyL EC tablet 10 mg Daily PRN    dextromethorphan-guaiFENesin  mg/5 ml liquid 10 mL Q6H PRN    HYDROcodone-acetaminophen 7.5-325 mg per tablet 1 tablet Q4H PRN    LORazepam injection 2 mg QID PRN    metoclopramide HCl injection 5 mg QID (AC & HS)    mupirocin 2 % ointment BID    ondansetron injection 8 mg Q6H PRN    promethazine injection 25 mg Q4H PRN    simethicone chewable tablet 80 mg TID PRN    sodium bicarbonate 100 mEq in dextrose 5 % and 0.2 % NaCl 1,000 mL infusion Continuous    sodium chloride 0.9% flush 10 mL PRN    traZODone tablet 50 mg Nightly PRN     Current Outpatient Medications   Medication    INVEGA SUSTENNA 234 mg/1.5 mL Syrg injection       Objective:     Vital Signs (Most Recent):  Temp: 98.3 °F (36.8 °C) (05/06/22 0825)  Pulse: 65 (05/06/22 0825)  Resp: 18 (05/06/22 0825)  BP: 112/70 (05/06/22 0825)  SpO2: 96 % (05/06/22 0825)  O2 Device (Oxygen Therapy): room air (05/03/22 1905) Vital Signs (24h Range):  Temp:  [97.5 °F (36.4 °C)-98.8 °F (37.1 °C)] 98.3 °F (36.8 °C)  Pulse:  [58-74] 65  Resp:  [18-20] 18  SpO2:  [96 %-98 %] 96 %  BP: (112-133)/(66-81) 112/70     Weight: 96.8 kg (213 lb 6.5 oz) (05/03/22 0525)  Body mass  index is 32.45 kg/m².  Body surface area is 2.16 meters squared.    I/O last 3 completed shifts:  In: 3100 [P.O.:600; I.V.:2500]  Out: 2000 [Urine:2000]    Physical Exam  Constitutional:       General: He is not in acute distress.  HENT:      Head: Normocephalic and atraumatic.   Eyes:      Pupils: Pupils are equal, round, and reactive to light.   Cardiovascular:      Rate and Rhythm: Normal rate and regular rhythm.      Heart sounds: No murmur heard.    No friction rub. No gallop.   Pulmonary:      Effort: No respiratory distress.      Breath sounds: Normal breath sounds.   Abdominal:      General: Bowel sounds are normal.      Tenderness: There is no abdominal tenderness. There is no guarding.   Musculoskeletal:      Right lower leg: No edema.      Left lower leg: No edema.   Neurological:      Mental Status: He is alert and oriented to person, place, and time.       Significant Labs:  BMP:   Recent Labs   Lab 04/30/22  1503 05/01/22  0323 05/06/22  0508   *   < > 80      < > 143   K 4.1   < > 3.7      < > 99   CO2 14*   < > 33*   BUN 7   < > 16   CREATININE 2.08*   < > 2.12*   CALCIUM 9.8   < > 8.6   MG 3.2*  --   --     < > = values in this interval not displayed.        Significant Imaging:      Assessment/Plan:     * SARA (acute kidney injury)  Creatinine has risen significantly from 1.3-5.99.  CPK has risen to 46,000.  This is consistent with rhabdomyolysis.  Creatinine stable at 2. This should continue to improve.  Recommend repeat BMP in 2 weeks.          Thank you for your consult.     Jewel Batista MD  Nephrology  [facility] - [facility]

## 2022-05-06 NOTE — ASSESSMENT & PLAN NOTE
Patient with acute kidney injury likely d/t in setting of rhabomyolysis Which is currently worsening. Labs reviewed- Renal function/electrolytes with Estimated Creatinine Clearance: 58.6 mL/min (A) (based on SCr of 2.12 mg/dL (H)). according to latest data. Monitor urine output and serial BMP and adjust therapy as needed. Avoid nephrotoxins and renally dose meds for GFR listed above.     Appreciate evaluation management by Nephrology.    Renal function improving.  Patient still with nausea.  F/u with Dr. Batista

## 2022-05-06 NOTE — DISCHARGE SUMMARY
[facility] Medicine  Discharge Summary      Patient Name: Jorge Riggins  MRN: [id number]  Patient Class: IP- Inpatient  Admission Date: 4/30/2022  Hospital Length of Stay: 6 days  Discharge Date and Time:  05/06/2022 10:43 AM  Attending Physician: Sandra Goodman MD   Discharging Provider: Sandra Goodman MD  Primary Care Provider: Primary Doctor No      HPI:   Patient admits to using marijuana and ecstasy over the last couple of days says he had done 2 days his drug screen is present present for benzodiazepines and amphetamines and marijuana.  Today he was running around the apartment complex naked his mother was called when she got there he had some pants on they tried to severe doing min had to use a [place] on his arm to get him to remove from the premises.  He is search since then been sedated with Ativan is much more awake alert denies any homicidal or suicidal ideations      * No surgery found *      Hospital Course:   05/04/2022:  Patient's mental status appears relatively normal however he is unable to eat.  He is reporting some nausea and has requested Reglan.    Given persistent renal failure, patient may require a few more days for IV fluids.  Will defer to Nephrology.    5/5:  Patient not eating.  This may related to uremia.  Renal function is improving.  Anticipate discharge soon.    5/6: f/u with Nephrology, Dr. Batista, in 2 weeks with labs prior.        Goals of Care Treatment Preferences:  Code Status: Full Code      Consults:   Consults (From admission, onward)        Status Ordering Provider     Inpatient consult to Nephrology  Once        Provider:  Jewel Batista MD    Acknowledged KAMI JEFFERSON     Inpatient consult to Social Work  Once        Provider:  (Not yet assigned)    MACKENZIE Garsia          * SARA (acute kidney injury)  Patient with acute kidney injury likely d/t in setting of rhabomyolysis Which is currently worsening.  Labs reviewed- Renal function/electrolytes with Estimated Creatinine Clearance: 58.6 mL/min (A) (based on SCr of 2.12 mg/dL (H)). according to latest data. Monitor urine output and serial BMP and adjust therapy as needed. Avoid nephrotoxins and renally dose meds for GFR listed above.     Appreciate evaluation management by Nephrology.    Renal function improving.  Patient still with nausea.  F/u with Dr. Batista       Psychosis  On antipsychotic medication.   Possibly schizophrenia.   F/u with psychiatrist.  Will have family to  patient rather than allowing him to leave on his own.        Polysubstance abuse  Discussed that drug use contributed to presentation and can adverse affect his health, including death.    Counseling performed and patient endorsed understanding.        Rhabdomyolysis  CK elevated to 46,000.  Likely contributes to SARA.      Sinus tachycardia  Likely related to polysubstance use.      Fever  Initial fevers that are now resolved.   A related to amphetamines or other etiology.  No antibiotics currently but will continue to monitor.        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    Specialty: Nephrology  Contact information:  [address] [address]  [phone]             Primary Doctor No Follow up.                     Patient Instructions:   No discharge procedures on file.    Significant Diagnostic Studies: Labs:   BMP:   Recent Labs   Lab 05/05/22  0751  05/06/22  0508   GLU 83 80    143   K 3.6 3.7   CL 99 99   CO2 31 33*   BUN 18 16   CREATININE 3.64* 2.12*   CALCIUM 8.1* 8.6       Pending Diagnostic Studies:     Procedure Component Value Units Date/Time    EXTRA TUBES [[id number]] Collected: 05/05/22 0557    Order Status: Sent Lab Status: In process Updated: 05/05/22 0557    Specimen: Blood, Venous     Narrative:      The following orders were created for panel order EXTRA TUBES.  Procedure                               Abnormality         Status                     ---------                               -----------         ------                     Lavender Top Hold[[id number]]                                In process                   Please view results for these tests on the individual orders.    EXTRA TUBES [[id number]] Collected: 05/04/22 0537    Order Status: Sent Lab Status: In process Updated: 05/04/22 0547    Specimen: Blood, Venous     Narrative:      The following orders were created for panel order EXTRA TUBES.  Procedure                               Abnormality         Status                     ---------                               -----------         ------                     Lavender Top Hold[[id number]]                                In process                   Please view results for these tests on the individual orders.    EXTRA TUBES [[id number]] Collected: 05/03/22 0412    Order Status: Sent Lab Status: In process Updated: 05/03/22 0412    Specimen: Blood, Venous     Narrative:      The following orders were created for panel order EXTRA TUBES.  Procedure                               Abnormality         Status                     ---------                               -----------         ------                     Lavender Top Hold[[id number]]                                In process                   Please view results for these tests on the individual orders.    EXTRA TUBES [[id number]] Collected: 04/30/22 1503    Order Status:  Sent Lab Status: In process Updated: 04/30/22 1505    Specimen: Blood, Venous     Narrative:      The following orders were created for panel order EXTRA TUBES.  Procedure                               Abnormality         Status                     ---------                               -----------         ------                     Light Blue Top Hold[[id number]]                              In process                 Red Top Hold[[id number]]                                     In process                 Light Green Top Hold[[id number]]                             In process                   Please view results for these tests on the individual orders.         Medications:  Reconciled Home Medications:      Medication List      ASK your doctor about these medications    INVEGA SUSTENNA 234 mg/1.5 mL Syrg injection  Generic drug: paliperidone palmitate  Pt rec'd last dose on 4/4/2022.            Indwelling Lines/Drains at time of discharge:   Lines/Drains/Airways     None                 Time spent on the discharge of patient: >30 minutes         Sandra Goodman MD  Department of Hospital Medicine  [facility] - Orthopedic

## 2022-05-06 NOTE — PROGRESS NOTES
[facility] Medicine  Progress Note    Patient Name: Jorge Riggins  MRN: [id number]  Patient Class: IP- Inpatient   Admission Date: 4/30/2022  Length of Stay: 5 days  Attending Physician: Sandra Goodman MD  Primary Care Provider: Primary Doctor No        Subjective:     Principal Problem:SARA (acute kidney injury)        HPI:  Patient admits to using marijuana and ecstasy over the last couple of days says he had done 2 days his drug screen is present present for benzodiazepines and amphetamines and marijuana.  Today he was running around the apartment complex naked his mother was called when she got there he had some pants on they tried to severe doing min had to use a [place] on his arm to get him to remove from the premises.  He is search since then been sedated with Ativan is much more awake alert denies any homicidal or suicidal ideations      Overview/Hospital Course:  05/04/2022:  Patient's mental status appears relatively normal however he is unable to eat.  He is reporting some nausea and has requested Reglan.    Given persistent renal failure, patient may require a few more days for IV fluids.  Will defer to Nephrology.    5/5:  Patient not eating.  This may related to uremia.  Renal function is improving.  Anticipate discharge soon.      Interval History:     Review of Systems   Constitutional:  Negative for fever.   HENT: Negative.     Respiratory:  Negative for shortness of breath.    Cardiovascular:  Negative for chest pain.   Gastrointestinal:  Positive for nausea. Negative for abdominal pain and vomiting.   Genitourinary:  Negative for difficulty urinating, dysuria and hematuria.   Musculoskeletal:  Negative for back pain.   Psychiatric/Behavioral:  Negative for agitation and behavioral problems.    Objective:     Vital Signs (Most Recent):  Temp: 98.8 °F (37.1 °C) (05/05/22 1600)  Pulse: (!) 58 (05/05/22 1600)  Resp: 20 (05/05/22 2147)  BP: 127/81 (05/05/22  1600)  SpO2: 98 % (05/05/22 1600)   Vital Signs (24h Range):  Temp:  [98 °F (36.7 °C)-98.8 °F (37.1 °C)] 98.8 °F (37.1 °C)  Pulse:  [] 58  Resp:  [17-20] 20  SpO2:  [94 %-98 %] 98 %  BP: (118-145)/(77-87) 127/81     Weight: 96.8 kg (213 lb 6.5 oz)  Body mass index is 32.45 kg/m².    Intake/Output Summary (Last 24 hours) at 5/5/2022 4027  Last data filed at 5/5/2022 0358  Gross per 24 hour   Intake 1000 ml   Output --   Net 1000 ml      Physical Exam  Constitutional:       General: He is not in acute distress.  HENT:      Head: Normocephalic and atraumatic.   Eyes:      Pupils: Pupils are equal, round, and reactive to light.   Cardiovascular:      Rate and Rhythm: Normal rate and regular rhythm.      Heart sounds: No murmur heard.    No friction rub. No gallop.   Pulmonary:      Effort: No respiratory distress.      Breath sounds: Normal breath sounds.   Abdominal:      General: Bowel sounds are normal.      Tenderness: There is no abdominal tenderness. There is no guarding.   Musculoskeletal:      Right lower leg: No edema.      Left lower leg: No edema.   Neurological:      Mental Status: He is alert and oriented to person, place, and time.       Significant Labs: All pertinent labs within the past 24 hours have been reviewed.    Significant Imaging: I have reviewed all pertinent imaging results/findings within the past 24 hours.      Assessment/Plan:      * SARA (acute kidney injury)  Patient with acute kidney injury likely d/t in setting of rhabomyolysis Which is currently worsening. Labs reviewed- Renal function/electrolytes with Estimated Creatinine Clearance: 20 mL/min (A) (based on SCr of 6.2 mg/dL (H)). according to latest data. Monitor urine output and serial BMP and adjust therapy as needed. Avoid nephrotoxins and renally dose meds for GFR listed above.     Appreciate evaluation management by Nephrology.      Amphetamine delirium  Polysubstance use.  Mental status is now near  baseline.      Rhabdomyolysis  CK elevated to 46,000.  Likely contributes to SARA.      Sinus tachycardia  Likely related to polysubstance use.      Fever  Initial fevers that are now resolved.   A related to amphetamines or other etiology.  No antibiotics currently but will continue to monitor.        VTE Risk Mitigation (From admission, onward)         Ordered     IP VTE LOW RISK PATIENT  Once         04/30/22 1704     Place sequential compression device  Until discontinued         04/30/22 1704                Discharge Planning   LINDSEY:      Code Status: Full Code   Is the patient medically ready for discharge?:     Reason for patient still in hospital (select all that apply): Treatment  Discharge Plan A: Home                  Sandra Goodman MD  Department of Hospital Medicine   [facility] - Orthopedic

## 2024-07-31 ENCOUNTER — HOSPITAL ENCOUNTER (EMERGENCY)
Facility: HOSPITAL | Age: 29
Discharge: HOME | End: 2024-07-31
Attending: EMERGENCY MEDICINE

## 2024-07-31 VITALS
HEIGHT: 67 IN | TEMPERATURE: 98 F | DIASTOLIC BLOOD PRESSURE: 87 MMHG | WEIGHT: 214 LBS | OXYGEN SATURATION: 98 % | SYSTOLIC BLOOD PRESSURE: 130 MMHG | BODY MASS INDEX: 33.59 KG/M2 | RESPIRATION RATE: 20 BRPM | HEART RATE: 121 BPM

## 2024-07-31 DIAGNOSIS — T07.XXXA MULTIPLE ABRASIONS: Primary | ICD-10-CM

## 2024-07-31 PROCEDURE — 99281 EMR DPT VST MAYX REQ PHY/QHP: CPT

## 2024-08-01 ENCOUNTER — HOSPITAL ENCOUNTER (EMERGENCY)
Facility: HOSPITAL | Age: 29
Discharge: HOME | End: 2024-08-02
Attending: EMERGENCY MEDICINE

## 2024-08-01 DIAGNOSIS — S61.411A LACERATION OF RIGHT HAND WITHOUT FOREIGN BODY, INITIAL ENCOUNTER: ICD-10-CM

## 2024-08-01 DIAGNOSIS — S61.412A LACERATION OF LEFT HAND WITHOUT FOREIGN BODY, INITIAL ENCOUNTER: Primary | ICD-10-CM

## 2024-08-01 DIAGNOSIS — F20.9 SCHIZOPHRENIA, UNSPECIFIED TYPE: ICD-10-CM

## 2024-08-01 DIAGNOSIS — S61.402A: ICD-10-CM

## 2024-08-01 DIAGNOSIS — R00.0 TACHYCARDIA: ICD-10-CM

## 2024-08-01 DIAGNOSIS — S66.902A: ICD-10-CM

## 2024-08-01 PROBLEM — F19.94 SUBSTANCE INDUCED MOOD DISORDER: Status: ACTIVE | Noted: 2024-08-01

## 2024-08-01 LAB
ALBUMIN SERPL BCP-MCNC: 3.6 G/DL (ref 3.5–5)
ALBUMIN/GLOB SERPL: 1.1 {RATIO}
ALP SERPL-CCNC: 89 U/L (ref 45–115)
ALT SERPL W P-5'-P-CCNC: 44 U/L (ref 16–61)
AMORPHOUS CRYSTALS, UA (OHS): ABNORMAL /HPF
AMPHET UR QL SCN: POSITIVE
ANION GAP SERPL CALCULATED.3IONS-SCNC: 12 MMOL/L (ref 7–16)
APAP SERPL-MCNC: <2 ΜG/ML (ref 10–30)
AST SERPL W P-5'-P-CCNC: 92 U/L (ref 15–37)
BACTERIA #/AREA URNS HPF: ABNORMAL /HPF
BARBITURATES UR QL SCN: NEGATIVE
BASOPHILS # BLD AUTO: 0.07 K/UL (ref 0–0.2)
BASOPHILS NFR BLD AUTO: 0.8 % (ref 0–1)
BENZODIAZ METAB UR QL SCN: POSITIVE
BILIRUB SERPL-MCNC: 1.3 MG/DL (ref ?–1.2)
BILIRUB UR QL STRIP: NEGATIVE
BUN SERPL-MCNC: 9 MG/DL (ref 7–18)
BUN/CREAT SERPL: 6 (ref 6–20)
CALCIUM SERPL-MCNC: 8.2 MG/DL (ref 8.5–10.1)
CANNABINOIDS UR QL SCN: POSITIVE
CHLORIDE SERPL-SCNC: 112 MMOL/L (ref 98–107)
CLARITY UR: ABNORMAL
CO2 SERPL-SCNC: 21 MMOL/L (ref 21–32)
COCAINE UR QL SCN: NEGATIVE
COLOR UR: YELLOW
CREAT SERPL-MCNC: 1.49 MG/DL (ref 0.7–1.3)
DIFFERENTIAL METHOD BLD: ABNORMAL
EGFR (NO RACE VARIABLE) (RUSH/TITUS): 64 ML/MIN/1.73M2
EOSINOPHIL # BLD AUTO: 0.28 K/UL (ref 0–0.5)
EOSINOPHIL NFR BLD AUTO: 3.1 % (ref 1–4)
ERYTHROCYTE [DISTWIDTH] IN BLOOD BY AUTOMATED COUNT: 13.1 % (ref 11.5–14.5)
ETHANOL, BLOOD (CATEGORY): NOT DETECTED
GLOBULIN SER-MCNC: 3.2 G/DL (ref 2–4)
GLUCOSE SERPL-MCNC: 128 MG/DL (ref 74–106)
GLUCOSE UR STRIP-MCNC: NORMAL MG/DL
HCT VFR BLD AUTO: 42.4 % (ref 40–54)
HGB BLD-MCNC: 14.7 G/DL (ref 13.5–18)
IMM GRANULOCYTES # BLD AUTO: 0.03 K/UL (ref 0–0.04)
IMM GRANULOCYTES NFR BLD: 0.3 % (ref 0–0.4)
KETONES UR STRIP-SCNC: NEGATIVE MG/DL
LEUKOCYTE ESTERASE UR QL STRIP: NEGATIVE
LYMPHOCYTES # BLD AUTO: 2.15 K/UL (ref 1–4.8)
LYMPHOCYTES NFR BLD AUTO: 23.6 % (ref 27–41)
MAGNESIUM SERPL-MCNC: 2.1 MG/DL (ref 1.7–2.3)
MCH RBC QN AUTO: 30 PG (ref 27–31)
MCHC RBC AUTO-ENTMCNC: 34.7 G/DL (ref 32–36)
MCV RBC AUTO: 86.5 FL (ref 80–96)
MONOCYTES # BLD AUTO: 0.96 K/UL (ref 0–0.8)
MONOCYTES NFR BLD AUTO: 10.5 % (ref 2–6)
MPC BLD CALC-MCNC: 10 FL (ref 9.4–12.4)
NEUTROPHILS # BLD AUTO: 5.62 K/UL (ref 1.8–7.7)
NEUTROPHILS NFR BLD AUTO: 61.7 % (ref 53–65)
NITRITE UR QL STRIP: NEGATIVE
NRBC # BLD AUTO: 0 X10E3/UL
NRBC, AUTO (.00): 0 %
OPIATES UR QL SCN: NEGATIVE
PCP UR QL SCN: NEGATIVE
PH UR STRIP: 6 PH UNITS
PLATELET # BLD AUTO: 260 K/UL (ref 150–400)
POTASSIUM SERPL-SCNC: 3.7 MMOL/L (ref 3.5–5.1)
PROT SERPL-MCNC: 6.8 G/DL (ref 6.4–8.2)
PROT UR QL STRIP: 50
RBC # BLD AUTO: 4.9 M/UL (ref 4.6–6.2)
RBC # UR STRIP: NEGATIVE /UL
RBC #/AREA URNS HPF: 0 /HPF
SALICYLATES SERPL-MCNC: <1.7 MG/DL (ref 3–30)
SODIUM SERPL-SCNC: 141 MMOL/L (ref 136–145)
SP GR UR STRIP: 1.03
UROBILINOGEN UR STRIP-ACNC: 2 MG/DL
WBC # BLD AUTO: 9.11 K/UL (ref 4.5–11)
WBC #/AREA URNS HPF: 2 /HPF

## 2024-08-01 PROCEDURE — 12042 INTMD RPR N-HF/GENIT2.6-7.5: CPT

## 2024-08-01 PROCEDURE — 93010 ELECTROCARDIOGRAM REPORT: CPT | Mod: ,,, | Performed by: INTERNAL MEDICINE

## 2024-08-01 PROCEDURE — 99284 EMERGENCY DEPT VISIT MOD MDM: CPT | Mod: 25

## 2024-08-01 PROCEDURE — 80307 DRUG TEST PRSMV CHEM ANLYZR: CPT | Performed by: EMERGENCY MEDICINE

## 2024-08-01 PROCEDURE — 80143 DRUG ASSAY ACETAMINOPHEN: CPT | Performed by: EMERGENCY MEDICINE

## 2024-08-01 PROCEDURE — 96374 THER/PROPH/DIAG INJ IV PUSH: CPT

## 2024-08-01 PROCEDURE — S4991 NICOTINE PATCH NONLEGEND: HCPCS | Performed by: EMERGENCY MEDICINE

## 2024-08-01 PROCEDURE — 93005 ELECTROCARDIOGRAM TRACING: CPT

## 2024-08-01 PROCEDURE — 81001 URINALYSIS AUTO W/SCOPE: CPT | Performed by: EMERGENCY MEDICINE

## 2024-08-01 PROCEDURE — 25000003 PHARM REV CODE 250: Performed by: EMERGENCY MEDICINE

## 2024-08-01 PROCEDURE — 83735 ASSAY OF MAGNESIUM: CPT | Performed by: EMERGENCY MEDICINE

## 2024-08-01 PROCEDURE — 85025 COMPLETE CBC W/AUTO DIFF WBC: CPT | Performed by: EMERGENCY MEDICINE

## 2024-08-01 PROCEDURE — 81003 URINALYSIS AUTO W/O SCOPE: CPT | Performed by: EMERGENCY MEDICINE

## 2024-08-01 PROCEDURE — 36415 COLL VENOUS BLD VENIPUNCTURE: CPT | Performed by: EMERGENCY MEDICINE

## 2024-08-01 PROCEDURE — 80053 COMPREHEN METABOLIC PANEL: CPT | Performed by: EMERGENCY MEDICINE

## 2024-08-01 PROCEDURE — 12002 RPR S/N/AX/GEN/TRNK2.6-7.5CM: CPT

## 2024-08-01 PROCEDURE — 87635 SARS-COV-2 COVID-19 AMP PRB: CPT | Performed by: EMERGENCY MEDICINE

## 2024-08-01 PROCEDURE — 82077 ASSAY SPEC XCP UR&BREATH IA: CPT | Performed by: EMERGENCY MEDICINE

## 2024-08-01 PROCEDURE — 63600175 PHARM REV CODE 636 W HCPCS: Performed by: EMERGENCY MEDICINE

## 2024-08-01 PROCEDURE — 96361 HYDRATE IV INFUSION ADD-ON: CPT

## 2024-08-01 PROCEDURE — 80179 DRUG ASSAY SALICYLATE: CPT | Performed by: EMERGENCY MEDICINE

## 2024-08-01 RX ORDER — IBUPROFEN 200 MG
1 TABLET ORAL
Status: COMPLETED | OUTPATIENT
Start: 2024-08-01 | End: 2024-08-02

## 2024-08-01 RX ORDER — MIDAZOLAM HYDROCHLORIDE 2 MG/2ML
8 INJECTION, SOLUTION INTRAMUSCULAR; INTRAVENOUS
Status: DISCONTINUED | OUTPATIENT
Start: 2024-08-01 | End: 2024-08-01

## 2024-08-01 RX ORDER — KETOROLAC TROMETHAMINE 30 MG/ML
30 INJECTION, SOLUTION INTRAMUSCULAR; INTRAVENOUS
Status: COMPLETED | OUTPATIENT
Start: 2024-08-02 | End: 2024-08-01

## 2024-08-01 RX ORDER — LIDOCAINE HYDROCHLORIDE 10 MG/ML
40 INJECTION, SOLUTION INFILTRATION; PERINEURAL
Status: COMPLETED | OUTPATIENT
Start: 2024-08-01 | End: 2024-08-01

## 2024-08-01 RX ADMIN — NICOTINE 1 PATCH: 21 PATCH, EXTENDED RELEASE TRANSDERMAL at 06:08

## 2024-08-01 RX ADMIN — KETOROLAC TROMETHAMINE 30 MG: 30 INJECTION, SOLUTION INTRAMUSCULAR at 11:08

## 2024-08-01 RX ADMIN — LIDOCAINE HYDROCHLORIDE 40 ML: 10 INJECTION, SOLUTION INFILTRATION; PERINEURAL at 05:08

## 2024-08-01 RX ADMIN — SODIUM CHLORIDE 1000 ML: 9 INJECTION, SOLUTION INTRAVENOUS at 11:08

## 2024-08-01 NOTE — ED PROVIDER NOTES
Encounter Date: 7/31/2024    SCRIBE #1 NOTE: I, Kasandra Carpenter, am scribing for, and in the presence of,  Cortes Patricio MD. I have scribed the entire note.       History     Chief Complaint   Patient presents with    Laceration     This is a 27 y/o male,who presents to the ED via EMS for evaluation. He states he has an attitude problem and punched windows. There is no hx of suicidal thoughts or homicidal thoughts. There are no other complaints/pain in the ED at this time. There is no past medical hx on file. There is no surgical hx on file. He is a current every day smoker.       The history is provided by the patient and the EMS personnel. No  was used.     Review of patient's allergies indicates:  No Known Allergies  Past Medical History:   Diagnosis Date    Schizophrenia, unspecified      History reviewed. No pertinent surgical history.  No family history on file.  Social History     Tobacco Use    Smoking status: Every Day   Substance Use Topics    Alcohol use: Yes    Drug use: Yes     Review of Systems   Skin:         Lacerations and abrasions.      Psychiatric/Behavioral:  Positive for behavioral problems. Negative for self-injury.    All other systems reviewed and are negative.      Physical Exam     Initial Vitals [07/31/24 2209]   BP Pulse Resp Temp SpO2   130/87 (!) 121 20 98.1 °F (36.7 °C) 98 %      MAP       --         Physical Exam    Nursing note and vitals reviewed.  Constitutional: He appears well-developed and well-nourished.   HENT:   Head: Normocephalic and atraumatic.   Eyes: Conjunctivae and EOM are normal. Pupils are equal, round, and reactive to light.   Neck: Neck supple.   Normal range of motion.  Cardiovascular:  Normal rate, regular rhythm, normal heart sounds and intact distal pulses.           Pulmonary/Chest: Breath sounds normal.   Abdominal: Abdomen is soft. Bowel sounds are normal.   Musculoskeletal:         General: Normal range of motion.      Cervical  back: Normal range of motion and neck supple.     Neurological: He is alert and oriented to person, place, and time. He has normal strength.   Skin: Skin is warm and dry. Capillary refill takes less than 2 seconds.   There are abrasions noted to the patient's arms and hands.      Psychiatric: He has a normal mood and affect. Thought content normal.         ED Course   Procedures  Labs Reviewed - No data to display       Imaging Results    None          Medications - No data to display  Medical Decision Making  This is a 27 y/o male,who presents to the ED via EMS for evaluation. He states he has an attitude problem and punched windows. There is no hx of suicidal thoughts or homicidal thoughts. There are no other complaints/pain in the ED at this time. There is no past medical hx on file. There is no surgical hx on file. He is a current every day smoker.     Amount and/or Complexity of Data Reviewed  Independent Historian: EMS     Details: We spoke with EMS and the patient.     External Data Reviewed: labs.              Attending Attestation:           Physician Attestation for Scribe:  Physician Attestation Statement for Scribe #1: I, Cortes Patricio MD, reviewed documentation, as scribed by Kasandra Carpenter in my presence, and it is both accurate and complete.                                    Clinical Impression:  Final diagnoses:  [T07.XXXA] Multiple abrasions (Primary)          ED Disposition Condition    Discharge Stable          ED Prescriptions    None       Follow-up Information       Follow up With Specialties Details Why Contact Info    [facility] - Emergency Department Emergency Medicine  As needed [facility] [address] [place] [id number] [phone]             Cortes Patricio MD  08/15/24 4898       Cortes Patricio MD  08/15/24 9601

## 2024-08-01 NOTE — ED PROVIDER NOTES
Encounter Date: 8/1/2024    SCRIBE #1 NOTE: I, Marguerite Daigle, am scribing for, and in the presence of,  Cholo Smallwood MD.   SCRIBE #2 NOTE: I, Kasandra Carpenter, am scribing for, and in the presence of,  Cortes Patricio MD. I have scribed the following portions of the note - Other sections scribed: Procedue.     History     Chief Complaint   Patient presents with    Laceration     Multiple lacerations after punching a window.  Pt was uncooperative with [employer], was tazed.       This pt is a 27 y/o male that presents to the ED via EMS with c/o Multiple Lacerations. The pt stated he was at his apartment, and started punching out the windows. The pt's relative stated the exact same thing the pt reported. The [facility] [employer] was called and they tased the pt. This stopped his out bursts of punching out the windows. Pt has past Mhx of behavioral problems. Upon examination the pt has 2 lacerations on his right hand, 2 lacerations on his left hand and 1 laceration on his left wrist, and 1 laceration forearm. There are on other issues to report with this pt at this time.     The history is provided by the patient, a friend and the EMS personnel. No  was used.     Review of patient's allergies indicates:  No Known Allergies  Past Medical History:   Diagnosis Date    Schizophrenia, unspecified      History reviewed. No pertinent surgical history.  No family history on file.  Social History     Tobacco Use    Smoking status: Every Day   Substance Use Topics    Alcohol use: Yes    Drug use: Yes     Review of Systems   Constitutional:  Negative for chills and fever.   HENT:  Negative for sore throat.    Respiratory:  Negative for shortness of breath.    Cardiovascular:  Negative for chest pain.   Gastrointestinal:  Negative for abdominal pain and vomiting.   Endocrine: Negative for polyuria.   Genitourinary:  Negative for flank pain and testicular pain.   Skin:  Positive for wound.   All other systems  reviewed and are negative.      Physical Exam     Initial Vitals [08/01/24 1654]   BP Pulse Resp Temp SpO2   120/82 (!) 127 14 99.2 °F (37.3 °C) 98 %      MAP       --         Physical Exam    Nursing note and vitals reviewed.  HENT:   Head: Normocephalic and atraumatic.   Mouth/Throat: Oropharynx is clear and moist.   Eyes: Pupils are equal, round, and reactive to light.   Neck: Neck supple.   Normal range of motion.  Cardiovascular:  Normal rate and regular rhythm.           Pulmonary/Chest: Effort normal and breath sounds normal.   Abdominal: Abdomen is soft. He exhibits no distension.   Musculoskeletal:         General: Normal range of motion.      Left forearm: Laceration present.      Left wrist: Laceration present.      Right hand: Laceration present.      Left hand: Laceration present.        Arms:       Cervical back: Normal range of motion and neck supple.      Comments: Pt has 2 laceration on right hand:  On top 4 cm lower  On side 21/2 cm   Pt has 4 lacerations on Left hand:  On top side 6 cm, Extensor tendon of ring finger and Distal Small finger extensor tendon Nicked,    Left middle finger 3 cm  On side wrist 3 cm  On forearm 3cm           Neurological: He is alert.   Skin: Skin is warm. Capillary refill takes less than 2 seconds.   Psychiatric: He has a normal mood and affect.         ED Course   Lac Repair    Date/Time: 8/1/2024 7:11 PM    Performed by: Cortes Patricio MD  Authorized by: Cortes Patricio MD    Consent:     Consent given by:  Patient    Risks discussed:  Retained foreign body  Universal protocol:     Patient identity confirmed:  Verbally with patient, arm band and provided demographic data  Anesthesia:     Anesthesia method:  Local infiltration    Local anesthetic:  Lidocaine 1% w/o epi  Laceration details:     Location:  Hand    Hand location:  L hand, dorsum    Length (cm):  3.5  Pre-procedure details:     Preparation:  Patient was prepped and draped in usual sterile  fashion  Exploration:     Imaging outcome: foreign body noted    Treatment:     Area cleansed with:  Povidone-iodine  Skin repair:     Repair method:  Sutures    Suture size:  3-0    Wound skin closure material used: ethilon sutures.    Number of sutures:  5  Lac Repair    Date/Time: 8/1/2024 7:20 PM    Performed by: Cortes Patricio MD  Authorized by: Cortes Patricio MD    Consent:     Consent obtained:  Verbal    Consent given by:  Patient  Universal protocol:     Patient identity confirmed:  Verbally with patient, arm band and provided demographic data  Anesthesia:     Anesthesia method:  Local infiltration    Local anesthetic:  Lidocaine 1% w/o epi  Laceration details:     Length (cm):  1  Exploration:     Imaging outcome: foreign body noted    Treatment:     Area cleansed with:  Povidone-iodine  Skin repair:     Repair method:  Sutures    Suture size:  3-0    Wound skin closure material used: ethilon sutures.    Number of sutures:  3  Repair type:     Repair type:  Simple  Lac Repair    Date/Time: 8/1/2024 7:24 PM    Performed by: Cortes Patricio MD  Authorized by: Cortes Patricio MD    Consent:     Consent obtained:  Verbal    Consent given by:  Patient  Anesthesia:     Anesthesia method:  Local infiltration    Local anesthetic:  Lidocaine 1% w/o epi  Laceration details:     Location:  Finger    Finger location:  L ring finger    Length (cm):  2  Pre-procedure details:     Preparation:  Patient was prepped and draped in usual sterile fashion  Treatment:     Area cleansed with:  Povidone-iodine  Skin repair:     Repair method:  Sutures    Suture size:  3-0    Wound skin closure material used: ethilon sutures.    Number of sutures:  4  Lac Repair    Date/Time: 8/1/2024 7:28 PM    Performed by: Cortes Patricio MD  Authorized by: Cortes Patricio MD    Consent:     Consent obtained:  Verbal  Universal protocol:     Patient identity confirmed:  Verbally with patient, arm band and provided demographic  data  Anesthesia:     Anesthesia method:  Local infiltration    Local anesthetic:  Lidocaine 1% w/o epi  Laceration details:     Location:  Shoulder/arm    Shoulder/arm location:  L lower arm    Length (cm):  4  Pre-procedure details:     Preparation:  Patient was prepped and draped in usual sterile fashion  Treatment:     Area cleansed with:  Povidone-iodine  Skin repair:     Repair method:  Sutures    Suture size:  4-0    Suture material:  Prolene    Suture technique:  Horizontal mattress    Number of sutures:  1    Labs Reviewed   COMPREHENSIVE METABOLIC PANEL - Abnormal       Result Value    Sodium 141      Potassium 3.7      Chloride 112 (*)     CO2 21      Anion Gap 12      Glucose 128 (*)     BUN 9      Creatinine 1.49 (*)     BUN/Creatinine Ratio 6      Calcium 8.2 (*)     Total Protein 6.8      Albumin 3.6      Globulin 3.2      A/G Ratio 1.1      Bilirubin, Total 1.3 (*)     Alk Phos 89      ALT 44      AST 92 (*)     eGFR 64     URINALYSIS, REFLEX TO URINE CULTURE - Abnormal    Color, UA Yellow      Clarity, UA Ex.Turbid      pH, UA 6.0      Leukocytes, UA Negative      Nitrites, UA Negative      Protein, UA 50 (*)     Glucose, UA Normal      Ketones, UA Negative      Urobilinogen, UA 2 (*)     Bilirubin, UA Negative      Blood, UA Negative      Specific [place], UA 1.028     DRUG SCREEN, URINE (BEAKER) - Abnormal    Barbiturates, Urine Negative      Benzodiazepine, Urine Positive (*)     Opiates, Urine Negative      Phencyclidine, Urine Negative      Amphetamine, Urine Positive (*)     Cannabinoid, Urine Positive (*)     Cocaine, Urine Negative      Narrative:     The results of screening tests should be considered presumptive. Confirmatory testing is available upon request.    Cutoff Points:  PCP:         25ng/mL  AMPH:        500ng/mL  JESSENIA:        200ng/mL  JULIO:        200ng/mL  THC:         50ng/mL  KORIN:         300ng/mL  OPI:         2000ng/mL   ACETAMINOPHEN LEVEL - Abnormal    Acetaminophen <2  (*)    SALICYLATE LEVEL - Abnormal    Salicylate <1.7 (*)    CBC WITH DIFFERENTIAL - Abnormal    WBC 9.11      RBC 4.90      Hemoglobin 14.7      Hematocrit 42.4      MCV 86.5      MCH 30.0      MCHC 34.7      RDW 13.1      Platelet Count 260      MPV 10.0      Neutrophils % 61.7      Lymphocytes % 23.6 (*)     Monocytes % 10.5 (*)     Eosinophils % 3.1      Basophils % 0.8      Immature Granulocytes % 0.3      nRBC, Auto 0.0      Neutrophils, Abs 5.62      Lymphocytes, Absolute 2.15      Monocytes, Absolute 0.96 (*)     Eosinophils, Absolute 0.28      Basophils, Absolute 0.07      Immature Granulocytes, Absolute 0.03      nRBC, Absolute 0.00      Diff Type Auto     URINALYSIS, MICROSCOPIC - Abnormal    WBC, UA 2      RBC, UA 0      Bacteria, UA Few (*)     Amorphous Crystals, UA Moderate (*)    MAGNESIUM - Normal    Magnesium 2.1     SARS-COV-2 RNA AMPLIFICATION, QUAL - Normal    SARS COV-2 Molecular Negative      Narrative:     Negative SARS-CoV results should not be used as the sole basis for treatment or patient management decisions; negative results should be considered in the context of a patient's recent exposures, history and the presene of clinical signs and symptoms consistent with COVID-19.  Negative results should be treated as presumptive and confirmed by molecular assay, if necessary for patient management.   CBC W/ AUTO DIFFERENTIAL    Narrative:     The following orders were created for panel order CBC auto differential.  Procedure                               Abnormality         Status                     ---------                               -----------         ------                     CBC with Differential[[id number]]       Abnormal            Final result                 Please view results for these tests on the individual orders.   ALCOHOL,MEDICAL (ETHANOL)    Ethanol Not Detected            Imaging Results    None          Medications   nicotine 21 mg/24 hr 1 patch (1 patch Transdermal  Patch Applied 8/2/24 1830)   LIDOcaine HCL 10 mg/ml (1%) injection 40 mL (40 mLs Infiltration Given 8/1/24 1746)   nicotine 21 mg/24 hr 1 patch (1 patch Transdermal Patch Removed 8/2/24 1830)   sodium chloride 0.9% bolus 1,000 mL 1,000 mL (0 mLs Intravenous Stopped 8/2/24 0017)   sodium chloride 0.9% bolus 1,000 mL 1,000 mL (0 mLs Intravenous Stopped 8/2/24 0018)   sodium chloride 0.9% bolus 1,000 mL 1,000 mL (0 mLs Intravenous Stopped 8/2/24 0018)   ketorolac injection 30 mg (30 mg Intravenous Given 8/1/24 2348)     Medical Decision Making  Schizophrenia exacerbation.  Also substance abuse.    Ddx:  schizophrenia exacerbation +/- substance-induced mood disorder vs other    Would benefit from inpatient treatment.  Medically cleared for psychiatric evaluation and treatment.      Amount and/or Complexity of Data Reviewed  Labs: ordered. Decision-making details documented in ED Course.    Risk  OTC drugs.  Prescription drug management.              Attending Attestation:           Physician Attestation for Scribe:  Physician Attestation Statement for Scribe #1: I, Cholo Smallwood MD, reviewed documentation, as scribed by Marguerite Daigle in my presence, and it is both accurate and complete.             ED Course as of 08/02/24 2106   Thu Aug 01, 2024   1705 Medical decision-making:  Differential diagnosis includes laceration of hand, laceration of wrist, laceration of forearm, muscle injury, tendon injury, nerve injury.   [BB]   1713 Because of extensor tendon injury I discussed case with orthopedic doctor on-call, Dr. Pitts.  We will suture lacerations and he will follow up patient up in clinic for tendon repair later. [BB]      ED Course User Index  [BB] Cholo Smallwood MD         Medically cleared for psychiatry placement: 8/1/2024 10:07 PM                   Clinical Impression:  Final diagnoses:  [S61.412A] Laceration of left hand without foreign body, initial encounter (Primary)  [S61.411A] Laceration of right hand  without foreign body, initial encounter  [S61.402A, S66.902A] Open wound of hand with tendon injury, left, initial encounter  [R00.0] Tachycardia  [F20.9] Schizophrenia, unspecified type          ED Disposition Condition    Discharge Stable          ED Prescriptions    None       Follow-up Information       Follow up With Specialties Details Why Contact Info    [facility] - Emergency Department Emergency Medicine  As needed [address] [place] [id number] [phone]             Cortes Patricio MD  08/01/24 3566       Cortes Patricio MD  08/02/24 7206

## 2024-08-01 NOTE — ED NOTES
Pt presents to ed via [facility] for eval after punching thru a couple of windows after getting mad;  multiple superficial lacs noted to posterior right shoulder, right arm , and right flank area;  moderate amount of glass shards noted to these areas as well;  bleeding is controlled at present;   pt denies being suicidal or homicidal at this time

## 2024-08-01 NOTE — ED NOTES
Attempted to clean pts wounds with peroxide and pt expressed his desire for me to stop and that he wanted to leave;  dr maxwell made aware

## 2024-08-02 VITALS
WEIGHT: 217 LBS | BODY MASS INDEX: 34.06 KG/M2 | OXYGEN SATURATION: 98 % | HEIGHT: 67 IN | DIASTOLIC BLOOD PRESSURE: 92 MMHG | SYSTOLIC BLOOD PRESSURE: 147 MMHG | TEMPERATURE: 99 F | HEART RATE: 100 BPM | RESPIRATION RATE: 17 BRPM

## 2024-08-02 LAB — SARS-COV-2 RDRP RESP QL NAA+PROBE: NEGATIVE

## 2024-08-02 PROCEDURE — 25000003 PHARM REV CODE 250: Performed by: EMERGENCY MEDICINE

## 2024-08-02 PROCEDURE — S4991 NICOTINE PATCH NONLEGEND: HCPCS | Performed by: EMERGENCY MEDICINE

## 2024-08-02 RX ORDER — IBUPROFEN 200 MG
1 TABLET ORAL DAILY
Status: DISCONTINUED | OUTPATIENT
Start: 2024-08-02 | End: 2024-08-02 | Stop reason: HOSPADM

## 2024-08-02 RX ADMIN — NICOTINE 1 PATCH: 21 PATCH, EXTENDED RELEASE TRANSDERMAL at 06:08

## 2024-08-02 NOTE — ED NOTES
"Pt complaining about short arm splint stating that it is "crooked" & causing his "arm to twist at the elbow".  Examined splint which appeared to be in proper position, pt with good capillary in affected hand.  Reassured pt splint was correct & instructed pt NOT to remove splint which pt's sister present at bedside repeatedly re-iterated.    "

## 2024-08-03 LAB
OHS QRS DURATION: 72 MS
OHS QTC CALCULATION: 418 MS

## 2024-08-03 NOTE — DISCHARGE INSTRUCTIONS
KEEP LACERATIONS CLEAN WITH SOAP AND WATER.  RETURN IN 10 DAYS FOR SUTURE REMOVAL.  RETURN IMMEDIATELY FOR ANY INCREASED SWELLING, REDNESS, PAIN OR DRAINAGE OR OTHERWISE AS NEEDED.  FOLLOW UP WITH ARMANDO.  CONTACT ARMANDO TOMORROW REGARDING A FOLLOW UP APPOINTMENT.

## 2024-09-04 ENCOUNTER — TELEPHONE (OUTPATIENT)
Dept: ORTHOPEDICS | Facility: CLINIC | Age: 29
End: 2024-09-04

## 2024-09-04 DIAGNOSIS — M79.642 LEFT HAND PAIN: Primary | ICD-10-CM

## 2024-09-04 NOTE — TELEPHONE ENCOUNTER
Left message for patient to call back and scheduled an appt for next week on Monday or Wednesday whichever is best for him.

## 2024-09-04 NOTE — TELEPHONE ENCOUNTER
----- Message from Mylene Heredia sent at 9/3/2024  3:54 PM CDT -----  Regarding: REFERRAL  Who Called: Jorge Riggins    Does the patient already have the specialty appointment scheduled?:no  If yes, what is the date of that appointment?:  Referral to What Specialty:ORTHOPEDICS  Reason for Referral:TENDON DAMAGE  Does the patient want the referral with a specific physician?:yes  If yes, which provider?: LILLY HODGE  [facility]    Preferred Method of Contact: Phone Call  Patient's Preferred Phone Number on File: [phone]   Best Call Back Number, if different:  Additional Information: PATIENT HAS A REFERRAL PUT IN BY DR ARMANDO

## 2024-09-09 ENCOUNTER — HOSPITAL ENCOUNTER (OUTPATIENT)
Dept: RADIOLOGY | Facility: HOSPITAL | Age: 29
Discharge: HOME | End: 2024-09-09
Attending: ORTHOPAEDIC SURGERY

## 2024-09-09 ENCOUNTER — OFFICE VISIT (OUTPATIENT)
Dept: ORTHOPEDICS | Facility: CLINIC | Age: 29
End: 2024-09-09

## 2024-09-09 DIAGNOSIS — M79.642 LEFT HAND PAIN: ICD-10-CM

## 2024-09-09 DIAGNOSIS — S66.902A: ICD-10-CM

## 2024-09-09 DIAGNOSIS — S61.402A: ICD-10-CM

## 2024-09-09 PROCEDURE — 73130 X-RAY EXAM OF HAND: CPT | Mod: TC,LT

## 2024-09-09 PROCEDURE — 99203 OFFICE O/P NEW LOW 30 MIN: CPT | Mod: S$PBB,,, | Performed by: ORTHOPAEDIC SURGERY

## 2024-09-09 PROCEDURE — 99213 OFFICE O/P EST LOW 20 MIN: CPT | Mod: PBBFAC,25 | Performed by: ORTHOPAEDIC SURGERY

## 2024-09-09 PROCEDURE — 99999 PR PBB SHADOW E&M-EST. PATIENT-LVL III: CPT | Mod: PBBFAC,,, | Performed by: ORTHOPAEDIC SURGERY

## 2024-09-09 PROCEDURE — 73130 X-RAY EXAM OF HAND: CPT | Mod: 26,LT,, | Performed by: ORTHOPAEDIC SURGERY

## 2024-09-09 NOTE — PROGRESS NOTES
Radiology Interpretation        Patient Name: Jorge Riggins  Date: 9/9/2024  YOB: 1995  MRN# [id number]        ORDERING DIAGNOSIS:    Encounter Diagnosis   Name Primary?    Open wound of hand with tendon injury, left, initial encounter         Three views AP lateral oblique left hand skeletally mature individual there is normal mineralization no fractures or subluxations no bony lesions impression no acute bony abnormalities left hand               Seamus Pitts MD

## 2024-09-09 NOTE — PROGRESS NOTES
Clinic Note        CC:   Chief Complaint   Patient presents with    Left Hand - Injury        Principal problem: No primary diagnosis found.     REASON FOR VISIT:       HISTORY:  28-year-old male right-hand dominant who was injured in his left hand when he struck a window and had lacerations.  ER sewn up in his hand.  At this time he has full extension all in his fingers he has some pain over the scars over the dorsal aspect of the hand.      PAST MEDICAL HISTORY:   Past Medical History:   Diagnosis Date    Schizophrenia, unspecified           PAST SURGICAL HISTORY: History reviewed. No pertinent surgical history.       ALLERGIES: Review of patient's allergies indicates:  No Known Allergies     MEDICATIONS :    Current Outpatient Medications:     INVEGA SUSTENNA 234 mg/1.5 mL Syrg injection, Pt rec'd last dose on 4/4/2022., Disp: , Rfl:      SOCIAL HISTORY:   Social History     Socioeconomic History    Marital status: Single   Tobacco Use    Smoking status: Every Day   Substance and Sexual Activity    Alcohol use: Yes    Drug use: Yes     Social Determinants of Health     Financial Resource Strain: Low Risk  (8/26/2024)    Received from [facility]    Overall Financial Resource Strain (CARDIA)     Difficulty of Paying Living Expenses: Not hard at all   Food Insecurity: No Food Insecurity (8/26/2024)    Received from [facility]    Hunger Vital Sign     Worried About Running Out of Food in the Last Year: Never true     Ran Out of Food in the Last Year: Never true   Transportation Needs: No Transportation Needs (8/26/2024)    Received from [facility]    PRAPARE - Transportation     Lack of Transportation (Medical): No     Lack of Transportation (Non-Medical): No   Physical Activity: Sufficiently Active (8/26/2024)    Received from [facility]    Exercise Vital Sign     Days of Exercise per  Week: 6 days     Minutes of Exercise per Session: 120 min   Stress: No Stress Concern Present (8/26/2024)    Received from [facility]    [employer] [place] [employer] - Occupational Stress Questionnaire     Feeling of Stress : Not at all   Housing Stability: Low Risk  (8/26/2024)    Received from [facility]    Housing Stability Vital Sign     Unable to Pay for Housing in the Last Year: No     Number of Times Moved in the Last Year: 1     Homeless in the Last Year: No          FAMILY HISTORY: No family history on file.       PHYSICAL EXAM:  In general, this is a well-developed, well-nourished male . The patient is alert, oriented and cooperative.      HEENT:  Normocephalic, atraumatic.  Extraocular movements are intact bilaterally.  The oropharynx is benign.       NECK:  Nontender with good range of motion.      LUNGS:  Clear to auscultation bilaterally.      HEART:  Demonstrates a regular rate and rhythm.  No murmurs appreciated.      ABDOMEN:  Soft, non-tender, non-distended.        EXTREMITIES:  Left upper extremity this gentleman does have full extension and flexion of his fingers.  He has palpable pulses at the wrist.  Sensation intact to his fingers.  No instability in the hand is noted he has motor function 5/5 distally.  He has multiple scars over the dorsal aspect of the hand there were healed.  He does have full extension in his little fingers well as if his long ring thumb and index.  Some soreness over his little finger tendon where he has a repair done in the ER.      RADIOGRAPHIC FINDINGS:  X-rays show no fracture subluxation left hand.      IMPRESSION: Open wound of hand with tendon injury, left, initial encounter  -     Ambulatory referral/consult to Orthopedics         PLAN:  Patient was scar tissue over the dorsum of his hand.  I will send him to work on range motion strengthening of the hand.  Follow up 6 weeks.    There are no  Patient Instructions on file for this visit.      No follow-ups on file.         Seamus Pitts      (Subject to voice recognition error, transcription service not allowed)

## 2024-09-24 NOTE — PROGRESS NOTES
[facility] Occupational Therapy  Initial Evaluation     Date: 9/25/2024  Name: Jorge Riggins  Clinic Number: [id number]    Therapy Diagnosis: No diagnosis found.  Physician: Seamus Pitts MD    Physician Orders: Evaluate and treat.  Medical Diagnosis: Open wound of hand with tendon injury, left, initial encounter   Date of Injury/Onset: 8/1/2024  Evaluation Date: 9/25/2024  Insurance Authorization Period Expiration: 9/9/2024 - 9/9/2025   Plan of Care Certification Period: 9/25/2024-11/27/2024    Visit # / Visits authorized: 1 / 1    FOTO: initial eval  Medicare Amount:     Time In:8:19  Time Out: 8:50  Total treatment time: 31minutes      Precautions:  Standard    Subjective     Involved Side: LUE  Dominant Side: Right  Date of Onset: 8/1/2024  History of Current Condition: Pt. Reports on 8/1/2024 he got angry and punched a window with both hands lacerating his hands. Went to ER and xrays were performed but did not reveal a fracture. He was sewn up and d/c home. He reports right hand is fine but left hand is very painful and he has trouble performing tasks. He reports difficulty with ROM and lifting things due to pain. He recently was referred to therapy.  Imaging: bone scan films       Past Medical History/Physical Systems Review:   Jorge Riggins  has a past medical history of Schizophrenia, unspecified.    Jorge Riggins  has no past surgical history on file.    Jorge has a current medication list which includes the following prescription(s): invega sustenna.    Review of patient's allergies indicates:  No Known Allergies     Patient's Goals for Therapy: To be able to use LUE again without pain    Pain:  Functional Pain Scale Rating 0-10:   7/10 on average  0 /10 at best  10/10 at worst  Location: Left UE  Description: Aching, Dull, Burning, Throbbing, Grabbing, and Tight  Aggravating Factors: Bending, Touching, Night Time, Morning, Extension, Flexing, and Lifting  Easing Factors:  rest and elevation    Occupation:  Unemployed  Working presently: unemployed      Functional Limitations/Social History:    Previous functional status includes: Independent with all ADLs.     Current FunctionalStatus   Home/Living environment : lives with their family      Limitation of Functional Status as follows:   ADLs/IADLs:     - Feeding: (I)    - Bathing: (I)    - Dressing/Grooming: (I)    - Driving: (I)             Objective     Observation/Appearance:     Edema. Measured in centimeters.   9/24/2024 9/24/2024    Right Left   2in. Above elbow     2in. Below elbow     Wrist Crease     Distal Palmar Crease     MCPs       Edema. Measured in centimeters.   9/24/2024 9/24/2024    Right Left   Index:       P1      PIP     P2      DIP     P3     Long:       P1      PIP     P2      DIP     P3     Ring:       P1                 PIP                P2                  DIP     P3     Small:        P1                 PIP            P2             DIP     P3     Thumb:     Prox. Phalanx     IP     Distal Phalanx       Wrist ROM. Measured in degrees.   9/24/2024 9/24/2024    Right Left   Wrist Ext/Flex 46/63 49/44     Hand ROM. Measured in degrees.   9/24/2024 9/24/2024    Right Left        Index: MP  76 80              PIP     100 106              DIP 69 63              KRUGER          Long:  MP 82 76               111              DIP 51 56              KRUGER          Ring:   MP 76 65               111              DIP 44 39              KRUGER          Small:  MP 74 69               PIP 98 106               DIP 60 54              KRUGER          Thumb: MP 51 56                IP 74 62      Strength (Dynamometer) and Pinch Strength (Pinch Gauge)  Measured in pounds.Pinch strength not tested due to being out for re-calibration.   9/24/2024 9/24/2024    Right Left   Rung II 75 65   Key Pinch NT NT   3pt Pinch NT NT   2pt Pinch NT NT       Manual Muscle Test   9/24/2024 9/24/2024    Right Left   Wrist Extension  5/5  4/5   Wrist Flexion 5/5 4/5         Special Tests  Thumb CMC Grind Test    Finkelstein's Test    Phalen's Test    Tinel's Test    Trae's Test    Syd-Littler Test    Digital Collateral Stress Test    ORL Test    Froment's Sign    Pinch OK Sign    Sera's Sign     Egawa Sign     Clamp Sign     SL Ballottement Test    LT Ballottement Test    Scaphoid/WatsonTest    Linscheid's Test    Metacarpal Stress Test    Piano Key Test    ECU Synergy Test    Ulnar Compression Test    TFCC Load Test    Ulnocarpal Stress Test    Midcarpal Shift Test    Pisiform Boost Test    Elbow Flexion Test    Scratch Collapse Test    Tennis Elbow Test    Resisted Middle Finger Extension Test    Mills Test    Chair Test    Biceps Squeeze Test    Biceps Hook Test    Milking Test    Press Up Manuever    PLRI Test    Valgus Stress Test    Varus Stress Test    Spurling Test    Cervical Distraction Test    ULNTT - General    ULNTT - Median Nerve    ULNTT - Radial Nerve    ULNTT - Ulnar Nerve         CMS Impairment/Limitation/Restriction for FOTO  Survey    Therapist reviewed FOTO scores for Jorge Riggins on 9/25/2024.   FOTO documents entered into [employer] - see Media section.    Limitation Score: 48%         Treatment     Treatment Time In: 8:19  Treatment Time Out: 8:50      Jorge received the following supervised modalities after being cleared for contradictions for  minutes:   -N/A    Jorge received the following manual therapy techniques for  minutes:   -N/A    Jorge received therapeutic exercises for  minutes including:  -N/A    Home Exercise Program/Education:  Issued HEP (see patient instructions in EMR) and educated on modality use for pain management . Exercises were reviewed and Jorge was able to demonstrate them prior to the end of the session.   Pt received a written copy of exercises to perform at home. Jorge demonstrated good  understanding of the education provided.  Pt was advised to perform these exercises free of pain,  and to stop performing them if pain occurs.    Patient/Family Education: role of OT, goals for OT, scheduling/cancellations - pt verbalized understanding. Discussed insurance limitations with patient.    Additional Education provided:     Assessment     Jorge Riggins is a 28 y.o. male referred to outpatient occupational therapy and presents with a medical diagnosis of Open wound of hand with tendon injury, left, initial encounter , resulting in decreased ROM/strength with increased pain and demonstrates limitations as described in the chart above. Following medical record review it is determined that pt will benefit from occupational therapy services in order to maximize pain free and/or functional use of left UE. The following goals were discussed with the patient and patient is in agreement with them as to be addressed in the treatment plan. The patient's rehab potential is Good.     Anticipated barriers to occupational therapy:   Pt has no cultural, educational or language barriers to learning provided.        Goals:   The following goals were discussed with the patient and patient is in agreement with them as to be addressed in the treatment plan.   Short term Goals:  1) Initiate HEP  2) Pt will increase AROM of LUE by 5-10 degrees in order to assist with functional full fist by 4 weeks.  3) Pt will reduce edema by .5-1 cm in affected fingers by 4 weeks.  4) Pt will reduce pain to less than 4/10 by 4 weeks.  5) Pt will increase functional  strength by 5# in order to A in opening containers for med management or home management tasks by 4 weeks.   6) Patient will be able to achieve less than or equal to 48% on the FOTO, demonstrating overall improved functional ability with upper extremity. (Self-care category)    Long Term Goals:  Goals to be met by discharge:  1) Independent with HEP  2) Pt will increase LUE KRUGER by 20-30 degrees in order to increase functional fist for grasp with home management or work  related tasks by d/c.   3) Pt will decrease edema to trace or none to increase functional ROM by d/c.   4) Pt will decrease pain to trace or none while completing light home management tasks or work related tasks by d/c.   5) Patient will be able to achieve less than or equal to 58% on the FOTO, demonstrating overall improved functional ability with upper extremity.  (Self-care category)        Plan   Certification Period/Plan of care expiration: 9/25/2024 to 11/27/2024.    Outpatient Occupational Therapy 1 times weekly for 8 weeks to include the following interventions: 80330 [therapeutic exercise], 33298 [manual therapy], 30213 [therapeutic activities], 56697 [ultrasound], 67465 [paraffin bath], 44525 [iontophoresis], and 98365 [orthotic/prosthetic management and training subsequent encounter].      PRISCILA RED, OT

## 2024-09-25 ENCOUNTER — CLINICAL SUPPORT (OUTPATIENT)
Dept: REHABILITATION | Facility: HOSPITAL | Age: 29
End: 2024-09-25

## 2024-09-25 DIAGNOSIS — S61.402A: ICD-10-CM

## 2024-09-25 DIAGNOSIS — S66.902A: ICD-10-CM

## 2024-09-25 PROCEDURE — 97165 OT EVAL LOW COMPLEX 30 MIN: CPT

## 2024-09-25 NOTE — PLAN OF CARE
[facility] Occupational Therapy  Initial Evaluation     Date: 9/25/2024  Name: Jorge Riggins  Clinic Number: [id number]    Therapy Diagnosis: No diagnosis found.  Physician: Seamus Pitts MD    Physician Orders: Evaluate and treat.  Medical Diagnosis: Open wound of hand with tendon injury, left, initial encounter   Date of Injury/Onset: 8/1/2024  Evaluation Date: 9/25/2024  Insurance Authorization Period Expiration: 9/9/2024 - 9/9/2025   Plan of Care Certification Period: 9/25/2024-11/27/2024    Visit # / Visits authorized: 1 / 1    FOTO: initial eval  Medicare Amount:     Time In:8:19  Time Out: 8:50  Total treatment time: 31minutes      Precautions:  Standard    Subjective     Involved Side: LUE  Dominant Side: Right  Date of Onset: 8/1/2024  History of Current Condition: Pt. Reports on 8/1/2024 he got angry and punched a window with both hands lacerating his hands. Went to ER and xrays were performed but did not reveal a fracture. He was sewn up and d/c home. He reports right hand is fine but left hand is very painful and he has trouble performing tasks. He reports difficulty with ROM and lifting things due to pain. He recently was referred to therapy.  Imaging: bone scan films       Past Medical History/Physical Systems Review:   Jorge Riggins  has a past medical history of Schizophrenia, unspecified.    Jorge Riggins  has no past surgical history on file.    Jorge has a current medication list which includes the following prescription(s): invega sustenna.    Review of patient's allergies indicates:  No Known Allergies     Patient's Goals for Therapy: To be able to use LUE again without pain    Pain:  Functional Pain Scale Rating 0-10:   7/10 on average  0/10 at best  10/10 at worst  Location: Left UE  Description: Aching, Dull, Burning, Throbbing, Grabbing, and Tight  Aggravating Factors: Bending, Touching, Night Time, Morning, Extension, Flexing, and Lifting  Easing Factors:  rest and elevation    Occupation:  Unemployed  Working presently: unemployed      Functional Limitations/Social History:    Previous functional status includes: Independent with all ADLs.     Current FunctionalStatus   Home/Living environment : lives with their family      Limitation of Functional Status as follows:   ADLs/IADLs:     - Feeding: (I)    - Bathing: (I)    - Dressing/Grooming: (I)    - Driving: (I)             Objective     Observation/Appearance:     Edema. Measured in centimeters.   9/24/2024 9/24/2024    Right Left   2in. Above elbow     2in. Below elbow     Wrist Crease     Distal Palmar Crease     MCPs       Edema. Measured in centimeters.   9/24/2024 9/24/2024    Right Left   Index:       P1      PIP     P2      DIP     P3     Long:       P1      PIP     P2      DIP     P3     Ring:       P1                 PIP                P2                  DIP     P3     Small:        P1                 PIP            P2             DIP     P3     Thumb:     Prox. Phalanx     IP     Distal Phalanx       Wrist ROM. Measured in degrees.   9/24/2024 9/24/2024    Right Left   Wrist Ext/Flex 46/63 49/44     Hand ROM. Measured in degrees.   9/24/2024 9/24/2024    Right Left        Index: MP  76 80              PIP     100 106              DIP 69 63              KRUGER          Long:  MP 82 76               111              DIP 51 56              KRUGER          Ring:   MP 76 65               111              DIP 44 39              KRUGER          Small:  MP 74 69               PIP 98 106               DIP 60 54              KRUGER          Thumb: MP 51 56                IP 74 62      Strength (Dynamometer) and Pinch Strength (Pinch Gauge)  Measured in pounds.Pinch strength not tested due to being out for re-calibration.   9/24/2024 9/24/2024    Right Left   Rung II 75 65   Key Pinch NT NT   3pt Pinch NT NT   2pt Pinch NT NT       Manual Muscle Test   9/24/2024 9/24/2024    Right Left   Wrist Extension  5/5  4/5   Wrist Flexion 5/5 4/5         Special Tests  Thumb CMC Grind Test    Finkelstein's Test    Phalen's Test    Tinel's Test    Trae's Test    Syd-Littler Test    Digital Collateral Stress Test    ORL Test    Froment's Sign    Pinch OK Sign    Sera's Sign     Egawa Sign     Clamp Sign     SL Ballottement Test    LT Ballottement Test    Scaphoid/WatsonTest    Linscheid's Test    Metacarpal Stress Test    Piano Key Test    ECU Synergy Test    Ulnar Compression Test    TFCC Load Test    Ulnocarpal Stress Test    Midcarpal Shift Test    Pisiform Boost Test    Elbow Flexion Test    Scratch Collapse Test    Tennis Elbow Test    Resisted Middle Finger Extension Test    Mills Test    Chair Test    Biceps Squeeze Test    Biceps Hook Test    Milking Test    Press Up Manuever    PLRI Test    Valgus Stress Test    Varus Stress Test    Spurling Test    Cervical Distraction Test    ULNTT - General    ULNTT - Median Nerve    ULNTT - Radial Nerve    ULNTT - Ulnar Nerve         CMS Impairment/Limitation/Restriction for FOTO  Survey    Therapist reviewed FOTO scores for Jorge Riggins on 9/25/2024.   FOTO documents entered into [employer] - see Media section.    Limitation Score: 48%         Treatment     Treatment Time In: 8:19  Treatment Time Out: 8:50      Jorge received the following supervised modalities after being cleared for contradictions for  minutes:   -N/A    Jorge received the following manual therapy techniques for  minutes:   -N/A    Jorge received therapeutic exercises for  minutes including:  -N/A    Home Exercise Program/Education:  Issued HEP (see patient instructions in EMR) and educated on modality use for pain management . Exercises were reviewed and Jorge was able to demonstrate them prior to the end of the session.   Pt received a written copy of exercises to perform at home. Jorge demonstrated good  understanding of the education provided.  Pt was advised to perform these exercises free of pain,  and to stop performing them if pain occurs.    Patient/Family Education: role of OT, goals for OT, scheduling/cancellations - pt verbalized understanding. Discussed insurance limitations with patient.    Additional Education provided:     Assessment     Jorge Riggins is a 28 y.o. male referred to outpatient occupational therapy and presents with a medical diagnosis of Open wound of hand with tendon injury, left, initial encounter , resulting in decreased ROM/strength with increased pain and demonstrates limitations as described in the chart above. Following medical record review it is determined that pt will benefit from occupational therapy services in order to maximize pain free and/or functional use of left UE. The following goals were discussed with the patient and patient is in agreement with them as to be addressed in the treatment plan. The patient's rehab potential is Good.     Anticipated barriers to occupational therapy:   Pt has no cultural, educational or language barriers to learning provided.        Goals:   The following goals were discussed with the patient and patient is in agreement with them as to be addressed in the treatment plan.   Short term Goals:  1) Initiate HEP  2) Pt will increase AROM of LUE by 5-10 degrees in order to assist with functional full fist by 4 weeks.  3) Pt will reduce edema by .5-1 cm in affected fingers by 4 weeks.  4) Pt will reduce pain to less than 4/10 by 4 weeks.  5) Pt will increase functional  strength by 5# in order to A in opening containers for med management or home management tasks by 4 weeks.   6) Patient will be able to achieve less than or equal to 48% on the FOTO, demonstrating overall improved functional ability with upper extremity. (Self-care category)    Long Term Goals:  Goals to be met by discharge:  1) Independent with HEP  2) Pt will increase LUE KRUGER by 20-30 degrees in order to increase functional fist for grasp with home management or work  related tasks by d/c.   3) Pt will decrease edema to trace or none to increase functional ROM by d/c.   4) Pt will decrease pain to trace or none while completing light home management tasks or work related tasks by d/c.   5) Patient will be able to achieve less than or equal to 58% on the FOTO, demonstrating overall improved functional ability with upper extremity.  (Self-care category)        Plan   Certification Period/Plan of care expiration: 9/25/2024 to 11/27/2024.    Outpatient Occupational Therapy 1 times weekly for 8 weeks to include the following interventions: 20910 [therapeutic exercise], 73379 [manual therapy], 24021 [therapeutic activities], 36957 [ultrasound], 05537 [paraffin bath], 12154 [iontophoresis], and 32675 [orthotic/prosthetic management and training subsequent encounter].      PRISCILA RED, OT

## 2024-10-15 ENCOUNTER — CLINICAL SUPPORT (OUTPATIENT)
Dept: REHABILITATION | Facility: HOSPITAL | Age: 29
End: 2024-10-15

## 2024-10-15 DIAGNOSIS — S66.902A: Primary | ICD-10-CM

## 2024-10-15 DIAGNOSIS — S61.402A: Primary | ICD-10-CM

## 2024-10-15 PROCEDURE — 97110 THERAPEUTIC EXERCISES: CPT

## 2024-10-15 PROCEDURE — 97140 MANUAL THERAPY 1/> REGIONS: CPT

## 2024-10-15 NOTE — PROGRESS NOTES
[facility]  Occupational Therapy Treatment Note     Date: 10/15/2024  Name: Jorge Riggins  Clinic Number: [id number]    Therapy Diagnosis: No diagnosis found.  Physician: Seamus Pitts MD    Physician Orders: Evaluate and treat.  Medical Diagnosis: Open wound of hand with tendon injury, left, initial encounter   Date of Injury/Onset: 8/1/2024  Evaluation Date: 9/25/2024  Insurance Authorization Period Expiration: 9/9/2024 - 9/9/2025   Plan of Care Certification Period: 9/25/2024-11/27/2024     Visit # / Visits authorized: 1 / 1     FOTO: initial eval  Medicare Amount:      Time In:1:02  Time Out: 1:45  Total treatment time: 43 minutes        Precautions:  Standard      Subjective     Patient reports: he has not been performing exercises at home due to misplacing paper.  He was compliant with home exercise program given last session.   Response to previous treatment:  Functional change:     Pain: 5/10  Location: left fingers , hands , and wrists      Objective     Objective Measures updated at progress report unless specified.    Treatment     Jorge received the treatments listed below:      Jorge received the following supervised modalities after being cleared for contradictions for  minutes:   N/A    Jorge received the following direct contact modalities after being cleared for contraindications for  minutes:  N/A        manual therapy techniques: Joint mobilizations, Manual traction, Myofacial release, Soft tissue Mobilization, and Friction Massage were applied to the: LUE for 18 minutes, including:  -LUE PROM- wrist flx/ext- 1x10, joint mobz/distraction- 2x10, tendon glides- 1x5    therapeutic exercises to develop strength, endurance, ROM, and flexibility for 25 minutes including:  -LUE AROM- wrist flx/ext- DB- 3x10, Handgripper- 6x10  , therabar- 3x10, digi flx- 3x10        therapeutic activities to improve functional performance for   minutes, including:  N/A        Patient  Education and Home Exercises     Education provided:   -   - Progress towards goals     Written Home Exercises Provided: Pt instructed to continue prior HEP.  Exercises were reviewed and Jorge was able to demonstrate them prior to the end of the session.  Jorge demonstrated good  understanding of the home exercise program provided. See electronic medical record under Patient Instructions for exercises provided during therapy sessions.       Assessment          Jorge is progressing well towards his goals and there are no updates to goals at this time. Pt prognosis is Fair.     Patient will continue to benefit from skilled outpatient occupational therapy to address the deficits listed in the problem list on initial evaluation provide patient/family education and to maximize patient's level of independence in the home and community environment.     Patient's spiritual, cultural and educational needs considered and patient agreeable to plan of care and goals.    Anticipated barriers to occupational therapy:     Goals:  Pt. Will increase LUE AROM as measured by goinometric measurements.  Pt. Will increase Left /pinch strength as measured by dynamometer/pinch gauge.  Pt. Will demonstrate ability to utilize LUE to perform functional tasks (I).  Pt. Will be (I) with HEP.    Plan     Continue OT POC.    PRISCILA RED, BONITA   10/15/2024

## 2024-10-29 ENCOUNTER — CLINICAL SUPPORT (OUTPATIENT)
Dept: REHABILITATION | Facility: HOSPITAL | Age: 29
End: 2024-10-29

## 2024-10-29 DIAGNOSIS — S61.402A: Primary | ICD-10-CM

## 2024-10-29 DIAGNOSIS — S66.902A: Primary | ICD-10-CM

## 2024-10-29 PROCEDURE — 97110 THERAPEUTIC EXERCISES: CPT

## 2024-10-29 PROCEDURE — 97140 MANUAL THERAPY 1/> REGIONS: CPT

## 2024-11-04 NOTE — PROGRESS NOTES
[facility]  Occupational Therapy Treatment Note     Date: 11/5/2024  Name: Jorge Riggins  Clinic Number: [id number]    Therapy Diagnosis: No diagnosis found.  Physician: Seamus Pitts MD    Physician Orders: Evaluate and treat.  Medical Diagnosis: Open wound of hand with tendon injury, left, initial encounter   Date of Injury/Onset: 8/1/2024  Evaluation Date: 9/25/2024  Insurance Authorization Period Expiration: 9/9/2024 - 9/9/2025   Plan of Care Certification Period: 9/25/2024-11/27/2024     Visit # / Visits authorized: 3/8     FOTO: initial eval  Medicare Amount:      Time In:10:50  Time Out: 11:31  Total treatment time: 41 minutes        Precautions:  Standard      Subjective     Patient reports: no new complaints.  He was compliant with home exercise program given last session.   Response to previous treatment:  Functional change:     Pain: 5/10  Location: left fingers , hands , and wrists      Objective     Objective Measures updated at progress report unless specified.    Treatment     Jorge received the treatments listed below:      Jorge received the following supervised modalities after being cleared for contradictions for  minutes:   N/A    Jorge received the following direct contact modalities after being cleared for contraindications for  minutes:  N/A        manual therapy techniques: Joint mobilizations, Manual traction, Myofacial release, Soft tissue Mobilization, and Friction Massage were applied to the: LUE for 18 minutes, including:  -LUE PROM- wrist flx/ext- 1x10, joint mobz/distraction- 1x10, tendon glides- 1x5    therapeutic exercises to develop strength, endurance, ROM, and flexibility for 23 minutes including:  -LUE AROM- wrist flx/ext- DB- 2x10, Handgripper- 2x10  , therabar- 2x10, digi flx- 2x10        therapeutic activities to improve functional performance for   minutes, including:  N/A        Patient Education and Home Exercises     Education provided:    -   - Progress towards goals     Written Home Exercises Provided: Pt instructed to continue prior HEP.  Exercises were reviewed and Jorge was able to demonstrate them prior to the end of the session.  Jorge demonstrated good  understanding of the home exercise program provided. See electronic medical record under Patient Instructions for exercises provided during therapy sessions.       Assessment          Jorge is progressing well towards his goals and there are no updates to goals at this time. Pt prognosis is Fair.     Patient will continue to benefit from skilled outpatient occupational therapy to address the deficits listed in the problem list on initial evaluation provide patient/family education and to maximize patient's level of independence in the home and community environment.     Patient's spiritual, cultural and educational needs considered and patient agreeable to plan of care and goals.    Anticipated barriers to occupational therapy:     Goals:  Pt. Will increase LUE AROM as measured by goinometric measurements.  Pt. Will increase Left /pinch strength as measured by dynamometer/pinch gauge.  Pt. Will demonstrate ability to utilize LUE to perform functional tasks (I).  Pt. Will be (I) with HEP.    Plan     Continue OT POC.    PRISCILA RED, BONITA   11/5/2024

## 2024-11-05 ENCOUNTER — CLINICAL SUPPORT (OUTPATIENT)
Dept: REHABILITATION | Facility: HOSPITAL | Age: 29
End: 2024-11-05

## 2024-11-05 DIAGNOSIS — S61.402A: Primary | ICD-10-CM

## 2024-11-05 DIAGNOSIS — S66.902A: Primary | ICD-10-CM

## 2024-11-05 PROCEDURE — 97140 MANUAL THERAPY 1/> REGIONS: CPT

## 2024-11-05 PROCEDURE — 97110 THERAPEUTIC EXERCISES: CPT
